# Patient Record
Sex: MALE | Race: BLACK OR AFRICAN AMERICAN | NOT HISPANIC OR LATINO | Employment: OTHER | URBAN - METROPOLITAN AREA
[De-identification: names, ages, dates, MRNs, and addresses within clinical notes are randomized per-mention and may not be internally consistent; named-entity substitution may affect disease eponyms.]

---

## 2018-01-05 ENCOUNTER — HOSPITAL ENCOUNTER (EMERGENCY)
Facility: HOSPITAL | Age: 43
Discharge: ANOTHER HOSPITAL | End: 2018-01-05
Attending: EMERGENCY MEDICINE
Payer: MEDICARE

## 2018-01-05 VITALS
RESPIRATION RATE: 20 BRPM | HEART RATE: 83 BPM | DIASTOLIC BLOOD PRESSURE: 86 MMHG | SYSTOLIC BLOOD PRESSURE: 120 MMHG | OXYGEN SATURATION: 98 % | TEMPERATURE: 98 F

## 2018-01-05 DIAGNOSIS — R44.3 HALLUCINATIONS: Primary | ICD-10-CM

## 2018-01-05 DIAGNOSIS — F20.3 UNDIFFERENTIATED SCHIZOPHRENIA: ICD-10-CM

## 2018-01-05 LAB
ALBUMIN SERPL BCP-MCNC: 3.5 G/DL
ALP SERPL-CCNC: 55 U/L
ALT SERPL W/O P-5'-P-CCNC: 16 U/L
ANION GAP SERPL CALC-SCNC: 12 MMOL/L
ANISOCYTOSIS BLD QL SMEAR: SLIGHT
APAP SERPL-MCNC: <3 UG/ML
AST SERPL-CCNC: 22 U/L
BASOPHILS # BLD AUTO: 0.06 K/UL
BASOPHILS NFR BLD: 1.9 %
BILIRUB SERPL-MCNC: 1 MG/DL
BUN SERPL-MCNC: 9 MG/DL
CALCIUM SERPL-MCNC: 9.1 MG/DL
CHLORIDE SERPL-SCNC: 93 MMOL/L
CO2 SERPL-SCNC: 31 MMOL/L
CREAT SERPL-MCNC: 0.8 MG/DL
DIFFERENTIAL METHOD: ABNORMAL
EOSINOPHIL # BLD AUTO: 0 K/UL
EOSINOPHIL NFR BLD: 0.6 %
ERYTHROCYTE [DISTWIDTH] IN BLOOD BY AUTOMATED COUNT: 14.2 %
EST. GFR  (AFRICAN AMERICAN): >60 ML/MIN/1.73 M^2
EST. GFR  (NON AFRICAN AMERICAN): >60 ML/MIN/1.73 M^2
ETHANOL SERPL-MCNC: <10 MG/DL
GLUCOSE SERPL-MCNC: 81 MG/DL
HCT VFR BLD AUTO: 45.5 %
HGB BLD-MCNC: 14.8 G/DL
LYMPHOCYTES # BLD AUTO: 1.6 K/UL
LYMPHOCYTES NFR BLD: 49.7 %
MCH RBC QN AUTO: 26.8 PG
MCHC RBC AUTO-ENTMCNC: 32.5 G/DL
MCV RBC AUTO: 82 FL
MONOCYTES # BLD AUTO: 0.8 K/UL
MONOCYTES NFR BLD: 23.8 %
NEUTROPHILS # BLD AUTO: 0.8 K/UL
NEUTROPHILS NFR BLD: 24 %
PLATELET # BLD AUTO: 192 K/UL
PLATELET BLD QL SMEAR: ABNORMAL
PMV BLD AUTO: 9.8 FL
POCT GLUCOSE: 90 MG/DL (ref 70–110)
POTASSIUM SERPL-SCNC: 3.3 MMOL/L
PROT SERPL-MCNC: 7.2 G/DL
RBC # BLD AUTO: 5.52 M/UL
SODIUM SERPL-SCNC: 136 MMOL/L
T4 FREE SERPL-MCNC: 1.18 NG/DL
TSH SERPL DL<=0.005 MIU/L-ACNC: 0.39 UIU/ML
WBC # BLD AUTO: 3.2 K/UL

## 2018-01-05 PROCEDURE — 80329 ANALGESICS NON-OPIOID 1 OR 2: CPT

## 2018-01-05 PROCEDURE — 80320 DRUG SCREEN QUANTALCOHOLS: CPT

## 2018-01-05 PROCEDURE — 84443 ASSAY THYROID STIM HORMONE: CPT

## 2018-01-05 PROCEDURE — 25000003 PHARM REV CODE 250: Performed by: PSYCHIATRY & NEUROLOGY

## 2018-01-05 PROCEDURE — 85025 COMPLETE CBC W/AUTO DIFF WBC: CPT

## 2018-01-05 PROCEDURE — 99285 EMERGENCY DEPT VISIT HI MDM: CPT | Mod: 25

## 2018-01-05 PROCEDURE — 82962 GLUCOSE BLOOD TEST: CPT

## 2018-01-05 PROCEDURE — 80053 COMPREHEN METABOLIC PANEL: CPT

## 2018-01-05 PROCEDURE — 84439 ASSAY OF FREE THYROXINE: CPT

## 2018-01-05 RX ORDER — PALIPERIDONE 3 MG/1
6 TABLET, EXTENDED RELEASE ORAL DAILY
Status: DISCONTINUED | OUTPATIENT
Start: 2018-01-05 | End: 2018-01-05 | Stop reason: HOSPADM

## 2018-01-05 RX ADMIN — PALIPERIDONE 6 MG: 3 TABLET, EXTENDED RELEASE ORAL at 06:01

## 2018-01-05 NOTE — ED NOTES
"2 pt identifiers for Gómez Ahumada verified and correct.    Pt presents to ED with multiple complaints but also with bizarre behavior. Pt states he wants his sugar checked, bp checked, heart checked. Pt having auditory hallucinations. Mumbling and speaking to "someone". Pt states "they said you hit on rivera punks." when asked who "they" were, he stated "Nevermind I am speaking too much.' pt continues to hum/sing and speak to himself. Pt not aggressive or combative. Pt denies SI/HI.   "

## 2018-01-05 NOTE — ED PROVIDER NOTES
"Encounter Date: 1/5/2018       History     Chief Complaint   Patient presents with    Bizarre Behavior     Pt with no sepcific complaints, requesting BP check, blood sugar check, vision test, hearing test, loos association in traige, denies hallucinations, denies SI/HI     42-year-old male with past medical history of hypertension and schizophrenia presents to the ED today for multiple complaints.  He is requesting to have his blood sugar, eyes, ears, digestive system, and his "numbers" checked.   The pt reports to me that he has been taking his BP meds as prescribed.  Per EMR, he has pmhx of schizophrenia, but denies use of any medications.  Pt has flight of ideas upon exam, and appears to be listening to and communicating with someone who is not in the room.  No other complaints at this time.       The history is provided by the patient and medical records.     Review of patient's allergies indicates:   Allergen Reactions    No known allergies      Past Medical History:   Diagnosis Date    Hypertension     Schizophrenia      Past Surgical History:   Procedure Laterality Date    ANKLE SURGERY       Family History   Problem Relation Age of Onset    Prostate cancer Neg Hx      Social History   Substance Use Topics    Smoking status: Never Smoker    Smokeless tobacco: Never Used    Alcohol use No     Review of Systems   Unable to perform ROS: Psychiatric disorder       Physical Exam     Initial Vitals [01/05/18 1254]   BP Pulse Resp Temp SpO2   (!) 193/135 98 16 98.1 °F (36.7 °C) 98 %      MAP       154.33         Physical Exam    Nursing note and vitals reviewed.  Constitutional: Vital signs are normal. He appears well-developed and well-nourished. He is active and cooperative.  Non-toxic appearance. He does not appear ill.   HENT:   Head: Normocephalic and atraumatic.   Eyes: Conjunctivae are normal.   Neck: Normal range of motion.   Cardiovascular: Normal rate and regular rhythm.   Pulmonary/Chest: Effort " normal and breath sounds normal.   Abdominal: Soft. Normal appearance and bowel sounds are normal. There is no tenderness.   Neurological: He is alert and oriented to person, place, and time. He has normal strength. No cranial nerve deficit or sensory deficit. GCS eye subscore is 4. GCS verbal subscore is 5. GCS motor subscore is 6.   Skin: Skin is warm, dry and intact. No rash noted.   Psychiatric: He has a normal mood and affect. His speech is normal and behavior is normal. Judgment and thought content normal. He is actively hallucinating. Cognition and memory are normal.         ED Course   Procedures  Labs Reviewed   CBC W/ AUTO DIFFERENTIAL - Abnormal; Notable for the following:        Result Value    WBC 3.20 (*)     MCH 26.8 (*)     Gran # 0.8 (*)     Gran% 24.0 (*)     Lymph% 49.7 (*)     Mono% 23.8 (*)     All other components within normal limits   COMPREHENSIVE METABOLIC PANEL - Abnormal; Notable for the following:     Potassium 3.3 (*)     Chloride 93 (*)     CO2 31 (*)     All other components within normal limits   TSH - Abnormal; Notable for the following:     TSH 0.394 (*)     All other components within normal limits   ACETAMINOPHEN LEVEL - Abnormal; Notable for the following:     Acetaminophen (Tylenol), Serum <3.0 (*)     All other components within normal limits   ALCOHOL,MEDICAL (ETHANOL)   T4, FREE   URINALYSIS   DRUG SCREEN PANEL, URINE EMERGENCY   POCT GLUCOSE   POCT GLUCOSE MONITORING CONTINUOUS             Medical Decision Making:   Initial Assessment:   42-year-old male with past medical history of hypertension and schizophrenia is here for multiple complaints.  The patient is exhibiting bizarre behavior with flight of ideas and is responding to auditory hallucinations.  Differential Diagnosis:   Psychiatric disorder, medication noncompliance, electrolyte derangement  Clinical Tests:   Lab Tests: Ordered and Reviewed  ED Management:  Labs,   1700:  at the bedside. Pt is medically  stable for psych evaluation.   Pt will be transferred for psych placement.                    ED Course      Clinical Impression:   The encounter diagnosis was Hallucinations.                           Trina Hensley, KATARINA  01/05/18 1944

## 2018-01-05 NOTE — ED NOTES
APPEARANCE: Alert, oriented and in no acute distress. +pt cooperative and calm at this time.   CARDIAC: Normal rate and rhythm. Pt denies chest pain.   RESPIRATORY:Normal rate and effort. Respirations are equal and unlabored no obvious signs of distress.  MUSC: Full ROM. No bony tenderness or soft tissue tenderness. No obvious deformity.  SKIN: Skin is warm and dry, normal skin turgor, mucous membranes moist.  NEURO: [place] coma scale: eyes open spontaneously-4, oriented & converses-5, obeys commands-6. No neurological abnormalities.   MENTAL STATUS: awake, alert and aware of environment. +pt presents with bizarre behavior, auditory hallucinations  EYE: PERRL, both eyes: pupils brisk and reactive to light. Normal size.

## 2018-01-08 NOTE — PSYCH
"IDENTIFICATION DATA:  This is a 42-year-old single  male who was   brought to ER by staff due to multiple somatic complaints.  The patient was   psychotic and noncompliant with medications.  The patient was seen at the   request of Dr. Hudson for psych evaluation.  The patient is on a PEC status.    CHIEF COMPLAINT:  "I came by myself for checkup."    HISTORY OF PRESENTING ILLNESS:  According to staff, the patient is acting   bizarre.  He was talking to his niece in his head.  He is noncompliant with   schizophrenic medicine.  The patient had multiple somatic complaints especially   related to blood pressure, sugar, eyes, GI and other body organs.  The patient   was fixated on schizophrenia.  The patient states that he cannot pronounce this   word, but a lady about eight months ago was diagnosed with this.  The patient   asked multiple times how to pronounce that word and saying the same phrase again   that he cannot pronounce that.  The patient was spelled out on the paper and he   said that he cannot read.  The patient denies use of alcohol and drugs.  His   urine drug screen in the past was negative.  The patient states that he has no   job, but it is not bothering much, but it could.  The patient states that he is   here for his help.  He denies any family problems.  The patient states that he   lives with his family.  He states that he trusts in God and life remains okay.    He was talking about Lord.  He was preoccupied and distracted.  He is very   intense.  He is disorganized at this time.  He was on Abilify and Invega before.    The patient denies any depression or anxiety.  He states that his sleep is   good.    PAST PSYCHIATRIC HISTORY:  The patient states that lady diagnosed him with   schizophrenia eight months ago.  The patient cannot remember the names of those   medicines, but recognized Invega and Abilify prescribed before.  The patient did   not answer questions about [place] and " Rehabilitation.  He is not homeless.    The patient is not on any schizophrenic medicines.  He is on Lipitor and   Cardizem.    SOCIAL HISTORY:  The patient states that he is not from here.  He is trying to   move.  The patient states that he lives with his family.  He is single and has   no children.  He states that he did not go to school and cannot read much.  He   denies family history of mental illness, suicidal or drug problems.    MEDICAL HISTORY:  Unremarkable.    CBC:  His WBC is 3.2, hemoglobin 14.3, hematocrit 45, MCV 84, platelets 193.    Sodium 136, potassium 3.3.  TSH 0.3, blood pressure is 120/86.    ALLERGIES:  He is not allergic to any medicine or food.    He is on Lipitor and Cardizem.  See H and P for details.    MENTAL STATUS EXAMINATION:  This is a 42-year-old healthy-looking tall    American male who is attired in blue scrubs, exhibited fair eye contact.  He has   intense look.  His speech is clear, but disorganized.  He is obsessed with word   schizophrenia versus schizophrenic and was trying to correct schizophrenia many   times and then said that he cannot pronounce it.  He was seen responding to   internal stimuli.  He was talking to his niece in his head.  He is distracted.    His mood is frustrated with constricted affect.  No tremors, injuries noted.  He   walked in a very strange way.  He denies hearing voices or seeing things.  The   patient has no thoughts of harm to self or others.  His insight and judgement   and fair.  He is of average intelligence person.    PSYCHIATRIC DIAGNOSES:  AXIS I:  Schizophrenia, paranoid type.  AXIS II:  No diagnoses.  AXIS III:  Hypercholesterolemia, hypertension.  AXIS IV:  Medical problems, noncompliant with medicines, chronic mental illness.  AXIS V:  35.    RECOMMENDATIONS:  We will transfer this patient to [facility] for   stabilization.  His initial diagnostic workup, we are trying to get information   from family.  We will restart  Abilify 6 mg p.o. q.a.m., and we will consider the   option of Invega Sustenna.    PROGNOSIS:  Fair.    ESTIMATED LENGTH OF STAY: 5 days    ASSETS:  The patient is verbal, medically stable, and has a place to live.          /venancio [id number] blank(s)        AI/KIA  dd: 01/05/2018 17:36:49 (CST)  td: 01/06/2018 04:06:08 (CST)  Doc ID   #[id number]  Job ID #[id number]    CC:

## 2021-04-30 ENCOUNTER — HOSPITAL ENCOUNTER (EMERGENCY)
Facility: HOSPITAL | Age: 46
Discharge: PSYCHIATRIC OR SUBSTANCE USE FACILITY | End: 2021-05-01
Attending: EMERGENCY MEDICINE
Payer: COMMERCIAL

## 2021-04-30 DIAGNOSIS — F23 ACUTE PSYCHOSIS: Primary | ICD-10-CM

## 2021-04-30 DIAGNOSIS — Z00.8 MEDICAL CLEARANCE FOR PSYCHIATRIC ADMISSION: ICD-10-CM

## 2021-04-30 DIAGNOSIS — R94.31 QT PROLONGATION: ICD-10-CM

## 2021-04-30 LAB
ALBUMIN SERPL BCP-MCNC: 4.1 G/DL (ref 3.5–5.2)
ALP SERPL-CCNC: 67 U/L (ref 55–135)
ALT SERPL W/O P-5'-P-CCNC: 20 U/L (ref 10–44)
AMPHET+METHAMPHET UR QL: NEGATIVE
ANION GAP SERPL CALC-SCNC: 15 MMOL/L (ref 8–16)
APAP SERPL-MCNC: <3 UG/ML (ref 10–20)
AST SERPL-CCNC: 23 U/L (ref 10–40)
BARBITURATES UR QL SCN>200 NG/ML: NEGATIVE
BASOPHILS # BLD AUTO: 0.09 K/UL (ref 0–0.2)
BASOPHILS NFR BLD: 0.8 % (ref 0–1.9)
BENZODIAZ UR QL SCN>200 NG/ML: NEGATIVE
BILIRUB SERPL-MCNC: 1.1 MG/DL (ref 0.1–1)
BILIRUB UR QL STRIP: NEGATIVE
BUN SERPL-MCNC: 20 MG/DL (ref 6–20)
BZE UR QL SCN: NEGATIVE
CALCIUM SERPL-MCNC: 10.2 MG/DL (ref 8.7–10.5)
CANNABINOIDS UR QL SCN: NEGATIVE
CHLORIDE SERPL-SCNC: 99 MMOL/L (ref 95–110)
CLARITY UR REFRACT.AUTO: CLEAR
CO2 SERPL-SCNC: 25 MMOL/L (ref 23–29)
COLOR UR AUTO: YELLOW
CREAT SERPL-MCNC: 1.4 MG/DL (ref 0.5–1.4)
CREAT UR-MCNC: 184 MG/DL (ref 23–375)
CTP QC/QA: YES
DIFFERENTIAL METHOD: ABNORMAL
EOSINOPHIL # BLD AUTO: 0.2 K/UL (ref 0–0.5)
EOSINOPHIL NFR BLD: 2.2 % (ref 0–8)
ERYTHROCYTE [DISTWIDTH] IN BLOOD BY AUTOMATED COUNT: 17.2 % (ref 11.5–14.5)
EST. GFR  (AFRICAN AMERICAN): >60 ML/MIN/1.73 M^2
EST. GFR  (NON AFRICAN AMERICAN): >60 ML/MIN/1.73 M^2
ETHANOL SERPL-MCNC: <10 MG/DL
GLUCOSE SERPL-MCNC: 94 MG/DL (ref 70–110)
GLUCOSE UR QL STRIP: NEGATIVE
HCT VFR BLD AUTO: 46.5 % (ref 40–54)
HGB BLD-MCNC: 15.7 G/DL (ref 14–18)
HGB UR QL STRIP: NEGATIVE
IMM GRANULOCYTES # BLD AUTO: 0.05 K/UL (ref 0–0.04)
IMM GRANULOCYTES NFR BLD AUTO: 0.4 % (ref 0–0.5)
KETONES UR QL STRIP: ABNORMAL
LEUKOCYTE ESTERASE UR QL STRIP: NEGATIVE
LYMPHOCYTES # BLD AUTO: 3.1 K/UL (ref 1–4.8)
LYMPHOCYTES NFR BLD: 27.5 % (ref 18–48)
MCH RBC QN AUTO: 27.1 PG (ref 27–31)
MCHC RBC AUTO-ENTMCNC: 33.8 G/DL (ref 32–36)
MCV RBC AUTO: 80 FL (ref 82–98)
METHADONE UR QL SCN>300 NG/ML: NEGATIVE
MONOCYTES # BLD AUTO: 1.4 K/UL (ref 0.3–1)
MONOCYTES NFR BLD: 12.3 % (ref 4–15)
NEUTROPHILS # BLD AUTO: 6.3 K/UL (ref 1.8–7.7)
NEUTROPHILS NFR BLD: 56.8 % (ref 38–73)
NITRITE UR QL STRIP: NEGATIVE
NRBC BLD-RTO: 0 /100 WBC
OPIATES UR QL SCN: NEGATIVE
PCP UR QL SCN>25 NG/ML: NEGATIVE
PH UR STRIP: 5 [PH] (ref 5–8)
PLATELET # BLD AUTO: 320 K/UL (ref 150–450)
PMV BLD AUTO: 10.2 FL (ref 9.2–12.9)
POTASSIUM SERPL-SCNC: 3.2 MMOL/L (ref 3.5–5.1)
PROT SERPL-MCNC: 7.9 G/DL (ref 6–8.4)
PROT UR QL STRIP: NEGATIVE
RBC # BLD AUTO: 5.79 M/UL (ref 4.6–6.2)
SARS-COV-2 RDRP RESP QL NAA+PROBE: NEGATIVE
SODIUM SERPL-SCNC: 139 MMOL/L (ref 136–145)
SP GR UR STRIP: 1.01 (ref 1–1.03)
TOXICOLOGY INFORMATION: NORMAL
TSH SERPL DL<=0.005 MIU/L-ACNC: 3.37 UIU/ML (ref 0.4–4)
URN SPEC COLLECT METH UR: ABNORMAL
WBC # BLD AUTO: 11.14 K/UL (ref 3.9–12.7)

## 2021-04-30 PROCEDURE — 80053 COMPREHEN METABOLIC PANEL: CPT | Performed by: PHYSICIAN ASSISTANT

## 2021-04-30 PROCEDURE — 85025 COMPLETE CBC W/AUTO DIFF WBC: CPT | Performed by: PHYSICIAN ASSISTANT

## 2021-04-30 PROCEDURE — 99285 PR EMERGENCY DEPT VISIT,LEVEL V: ICD-10-PCS | Mod: ,,, | Performed by: PHYSICIAN ASSISTANT

## 2021-04-30 PROCEDURE — 81003 URINALYSIS AUTO W/O SCOPE: CPT | Mod: 59 | Performed by: PHYSICIAN ASSISTANT

## 2021-04-30 PROCEDURE — 84443 ASSAY THYROID STIM HORMONE: CPT | Performed by: PHYSICIAN ASSISTANT

## 2021-04-30 PROCEDURE — 93005 ELECTROCARDIOGRAM TRACING: CPT

## 2021-04-30 PROCEDURE — 99283 EMERGENCY DEPT VISIT LOW MDM: CPT | Mod: 25

## 2021-04-30 PROCEDURE — 82077 ASSAY SPEC XCP UR&BREATH IA: CPT | Performed by: PHYSICIAN ASSISTANT

## 2021-04-30 PROCEDURE — U0002 COVID-19 LAB TEST NON-CDC: HCPCS | Performed by: PHYSICIAN ASSISTANT

## 2021-04-30 PROCEDURE — 99285 EMERGENCY DEPT VISIT HI MDM: CPT | Mod: ,,, | Performed by: PHYSICIAN ASSISTANT

## 2021-04-30 PROCEDURE — 93010 EKG 12-LEAD: ICD-10-PCS | Mod: ,,, | Performed by: INTERNAL MEDICINE

## 2021-04-30 PROCEDURE — 93010 ELECTROCARDIOGRAM REPORT: CPT | Mod: ,,, | Performed by: INTERNAL MEDICINE

## 2021-04-30 PROCEDURE — 80143 DRUG ASSAY ACETAMINOPHEN: CPT | Performed by: PHYSICIAN ASSISTANT

## 2021-04-30 PROCEDURE — 80307 DRUG TEST PRSMV CHEM ANLYZR: CPT | Performed by: PHYSICIAN ASSISTANT

## 2021-04-30 RX ORDER — BREXPIPRAZOLE 4 MG/1
1 TABLET ORAL DAILY
Status: ON HOLD | COMMUNITY
Start: 2021-04-23 | End: 2021-05-06 | Stop reason: HOSPADM

## 2021-04-30 RX ORDER — AMLODIPINE AND BENAZEPRIL HYDROCHLORIDE 10; 20 MG/1; MG/1
1 CAPSULE ORAL DAILY
Status: ON HOLD | COMMUNITY
Start: 2021-02-09 | End: 2021-05-06 | Stop reason: HOSPADM

## 2021-04-30 RX ORDER — METFORMIN HYDROCHLORIDE 500 MG/1
500 TABLET ORAL 2 TIMES DAILY
Status: ON HOLD | COMMUNITY
Start: 2021-02-09 | End: 2021-05-06 | Stop reason: SDUPTHER

## 2021-04-30 RX ORDER — ARIPIPRAZOLE 20 MG/1
40 TABLET ORAL NIGHTLY
Status: ON HOLD | COMMUNITY
Start: 2021-02-01 | End: 2021-05-06 | Stop reason: HOSPADM

## 2021-04-30 RX ORDER — HYDROCHLOROTHIAZIDE 50 MG/1
50 TABLET ORAL DAILY
Status: ON HOLD | COMMUNITY
Start: 2021-02-08 | End: 2021-05-06 | Stop reason: SDUPTHER

## 2021-04-30 RX ORDER — ARIPIPRAZOLE 20 MG/1
40 TABLET ORAL
Status: DISCONTINUED | OUTPATIENT
Start: 2021-04-30 | End: 2021-04-30

## 2021-05-01 VITALS
WEIGHT: 240 LBS | RESPIRATION RATE: 18 BRPM | HEART RATE: 108 BPM | BODY MASS INDEX: 32.51 KG/M2 | OXYGEN SATURATION: 94 % | DIASTOLIC BLOOD PRESSURE: 83 MMHG | TEMPERATURE: 99 F | SYSTOLIC BLOOD PRESSURE: 133 MMHG | HEIGHT: 72 IN

## 2021-05-01 PROBLEM — F29 PSYCHOSIS: Status: ACTIVE | Noted: 2021-05-01

## 2021-05-01 PROBLEM — E78.5 HLD (HYPERLIPIDEMIA): Status: ACTIVE | Noted: 2021-05-01

## 2021-05-01 PROBLEM — E11.9 TYPE 2 DIABETES MELLITUS: Status: ACTIVE | Noted: 2021-05-01

## 2021-05-01 PROBLEM — E87.6 HYPOKALEMIA: Status: ACTIVE | Noted: 2021-05-01

## 2021-05-06 PROBLEM — F20.3 UNDIFFERENTIATED SCHIZOPHRENIA: Status: ACTIVE | Noted: 2021-05-01

## 2021-06-20 ENCOUNTER — HOSPITAL ENCOUNTER (EMERGENCY)
Facility: HOSPITAL | Age: 46
Discharge: PSYCHIATRIC OR SUBSTANCE USE FACILITY | End: 2021-06-20
Attending: EMERGENCY MEDICINE
Payer: COMMERCIAL

## 2021-06-20 VITALS
RESPIRATION RATE: 20 BRPM | HEIGHT: 72 IN | TEMPERATURE: 99 F | OXYGEN SATURATION: 97 % | WEIGHT: 240 LBS | DIASTOLIC BLOOD PRESSURE: 65 MMHG | BODY MASS INDEX: 32.51 KG/M2 | HEART RATE: 81 BPM | SYSTOLIC BLOOD PRESSURE: 116 MMHG

## 2021-06-20 DIAGNOSIS — F20.9 SCHIZOPHRENIA, UNSPECIFIED TYPE: Primary | ICD-10-CM

## 2021-06-20 LAB
ALBUMIN SERPL BCP-MCNC: 3.6 G/DL (ref 3.5–5.2)
ALP SERPL-CCNC: 69 U/L (ref 55–135)
ALT SERPL W/O P-5'-P-CCNC: 17 U/L (ref 10–44)
AMPHET+METHAMPHET UR QL: NEGATIVE
ANION GAP SERPL CALC-SCNC: 9 MMOL/L (ref 8–16)
APAP SERPL-MCNC: <3 UG/ML (ref 10–20)
AST SERPL-CCNC: 17 U/L (ref 10–40)
BARBITURATES UR QL SCN>200 NG/ML: NEGATIVE
BASOPHILS # BLD AUTO: 0.04 K/UL (ref 0–0.2)
BASOPHILS NFR BLD: 0.6 % (ref 0–1.9)
BENZODIAZ UR QL SCN>200 NG/ML: NEGATIVE
BILIRUB SERPL-MCNC: 1.9 MG/DL (ref 0.1–1)
BILIRUB UR QL STRIP: NEGATIVE
BUN SERPL-MCNC: 11 MG/DL (ref 6–20)
BZE UR QL SCN: NEGATIVE
CALCIUM SERPL-MCNC: 8.7 MG/DL (ref 8.7–10.5)
CANNABINOIDS UR QL SCN: NEGATIVE
CHLORIDE SERPL-SCNC: 102 MMOL/L (ref 95–110)
CLARITY UR: CLEAR
CO2 SERPL-SCNC: 28 MMOL/L (ref 23–29)
COLOR UR: YELLOW
CREAT SERPL-MCNC: 0.8 MG/DL (ref 0.5–1.4)
CREAT UR-MCNC: 105.7 MG/DL (ref 23–375)
DIFFERENTIAL METHOD: ABNORMAL
EOSINOPHIL # BLD AUTO: 0.1 K/UL (ref 0–0.5)
EOSINOPHIL NFR BLD: 2.1 % (ref 0–8)
ERYTHROCYTE [DISTWIDTH] IN BLOOD BY AUTOMATED COUNT: 14.9 % (ref 11.5–14.5)
EST. GFR  (AFRICAN AMERICAN): >60 ML/MIN/1.73 M^2
EST. GFR  (NON AFRICAN AMERICAN): >60 ML/MIN/1.73 M^2
ETHANOL SERPL-MCNC: <10 MG/DL
GLUCOSE SERPL-MCNC: 80 MG/DL (ref 70–110)
GLUCOSE UR QL STRIP: NEGATIVE
HCT VFR BLD AUTO: 42.6 % (ref 40–54)
HGB BLD-MCNC: 14.2 G/DL (ref 14–18)
HGB UR QL STRIP: NEGATIVE
IMM GRANULOCYTES # BLD AUTO: 0.02 K/UL (ref 0–0.04)
IMM GRANULOCYTES NFR BLD AUTO: 0.3 % (ref 0–0.5)
KETONES UR QL STRIP: ABNORMAL
LEUKOCYTE ESTERASE UR QL STRIP: NEGATIVE
LYMPHOCYTES # BLD AUTO: 1.7 K/UL (ref 1–4.8)
LYMPHOCYTES NFR BLD: 25.4 % (ref 18–48)
MCH RBC QN AUTO: 28.1 PG (ref 27–31)
MCHC RBC AUTO-ENTMCNC: 33.3 G/DL (ref 32–36)
MCV RBC AUTO: 84 FL (ref 82–98)
METHADONE UR QL SCN>300 NG/ML: NEGATIVE
MONOCYTES # BLD AUTO: 1 K/UL (ref 0.3–1)
MONOCYTES NFR BLD: 14.6 % (ref 4–15)
NEUTROPHILS # BLD AUTO: 3.8 K/UL (ref 1.8–7.7)
NEUTROPHILS NFR BLD: 57 % (ref 38–73)
NITRITE UR QL STRIP: NEGATIVE
NRBC BLD-RTO: 0 /100 WBC
OPIATES UR QL SCN: NEGATIVE
PCP UR QL SCN>25 NG/ML: NEGATIVE
PH UR STRIP: 8 [PH] (ref 5–8)
PLATELET # BLD AUTO: 209 K/UL (ref 150–450)
PMV BLD AUTO: 10.1 FL (ref 9.2–12.9)
POTASSIUM SERPL-SCNC: 3.6 MMOL/L (ref 3.5–5.1)
PROT SERPL-MCNC: 6.4 G/DL (ref 6–8.4)
PROT UR QL STRIP: NEGATIVE
RBC # BLD AUTO: 5.05 M/UL (ref 4.6–6.2)
SARS-COV-2 RDRP RESP QL NAA+PROBE: NEGATIVE
SODIUM SERPL-SCNC: 139 MMOL/L (ref 136–145)
SP GR UR STRIP: 1.01 (ref 1–1.03)
TOXICOLOGY INFORMATION: NORMAL
TSH SERPL DL<=0.005 MIU/L-ACNC: 2.54 UIU/ML (ref 0.4–4)
URN SPEC COLLECT METH UR: ABNORMAL
UROBILINOGEN UR STRIP-ACNC: NEGATIVE EU/DL
WBC # BLD AUTO: 6.58 K/UL (ref 3.9–12.7)

## 2021-06-20 PROCEDURE — 80143 DRUG ASSAY ACETAMINOPHEN: CPT | Performed by: EMERGENCY MEDICINE

## 2021-06-20 PROCEDURE — U0002 COVID-19 LAB TEST NON-CDC: HCPCS | Performed by: EMERGENCY MEDICINE

## 2021-06-20 PROCEDURE — 99285 EMERGENCY DEPT VISIT HI MDM: CPT | Mod: 25

## 2021-06-20 PROCEDURE — 84443 ASSAY THYROID STIM HORMONE: CPT | Performed by: EMERGENCY MEDICINE

## 2021-06-20 PROCEDURE — 80053 COMPREHEN METABOLIC PANEL: CPT | Performed by: EMERGENCY MEDICINE

## 2021-06-20 PROCEDURE — 81003 URINALYSIS AUTO W/O SCOPE: CPT | Performed by: EMERGENCY MEDICINE

## 2021-06-20 PROCEDURE — 82077 ASSAY SPEC XCP UR&BREATH IA: CPT | Performed by: EMERGENCY MEDICINE

## 2021-06-20 PROCEDURE — 80307 DRUG TEST PRSMV CHEM ANLYZR: CPT | Performed by: EMERGENCY MEDICINE

## 2021-06-20 PROCEDURE — 85025 COMPLETE CBC W/AUTO DIFF WBC: CPT | Performed by: EMERGENCY MEDICINE

## 2023-02-18 ENCOUNTER — HOSPITAL ENCOUNTER (EMERGENCY)
Facility: HOSPITAL | Age: 48
Discharge: PSYCHIATRIC OR SUBSTANCE USE FACILITY | End: 2023-02-19
Attending: EMERGENCY MEDICINE
Payer: COMMERCIAL

## 2023-02-18 DIAGNOSIS — F20.9 SCHIZOPHRENIA, UNSPECIFIED TYPE: ICD-10-CM

## 2023-02-18 DIAGNOSIS — Z00.8 MEDICAL CLEARANCE FOR PSYCHIATRIC ADMISSION: ICD-10-CM

## 2023-02-18 DIAGNOSIS — R45.851 SUICIDAL IDEATION: Primary | ICD-10-CM

## 2023-02-18 DIAGNOSIS — F23 ACUTE PSYCHOSIS: ICD-10-CM

## 2023-02-18 LAB
ALBUMIN SERPL BCP-MCNC: 4.1 G/DL (ref 3.5–5.2)
ALP SERPL-CCNC: 71 U/L (ref 55–135)
ALT SERPL W/O P-5'-P-CCNC: 16 U/L (ref 10–44)
AMPHET+METHAMPHET UR QL: NEGATIVE
ANION GAP SERPL CALC-SCNC: 11 MMOL/L (ref 8–16)
ANION GAP SERPL CALC-SCNC: 12 MMOL/L (ref 8–16)
ANION GAP SERPL CALC-SCNC: 13 MMOL/L (ref 8–16)
APAP SERPL-MCNC: <3 UG/ML (ref 10–20)
AST SERPL-CCNC: 17 U/L (ref 10–40)
BARBITURATES UR QL SCN>200 NG/ML: NEGATIVE
BASOPHILS # BLD AUTO: 0.04 K/UL (ref 0–0.2)
BASOPHILS NFR BLD: 0.9 % (ref 0–1.9)
BENZODIAZ UR QL SCN>200 NG/ML: NEGATIVE
BILIRUB SERPL-MCNC: 1.5 MG/DL (ref 0.1–1)
BILIRUB UR QL STRIP: NEGATIVE
BUN SERPL-MCNC: 10 MG/DL (ref 6–30)
BUN SERPL-MCNC: 8 MG/DL (ref 6–20)
BUN SERPL-MCNC: 8 MG/DL (ref 6–20)
BUN SERPL-MCNC: 9 MG/DL (ref 6–20)
BUN SERPL-MCNC: 9 MG/DL (ref 6–30)
BZE UR QL SCN: NEGATIVE
CALCIUM SERPL-MCNC: 9.3 MG/DL (ref 8.7–10.5)
CALCIUM SERPL-MCNC: 9.6 MG/DL (ref 8.7–10.5)
CALCIUM SERPL-MCNC: 9.8 MG/DL (ref 8.7–10.5)
CANNABINOIDS UR QL SCN: NEGATIVE
CHLORIDE SERPL-SCNC: 93 MMOL/L (ref 95–110)
CHLORIDE SERPL-SCNC: 94 MMOL/L (ref 95–110)
CHLORIDE SERPL-SCNC: 96 MMOL/L (ref 95–110)
CLARITY UR REFRACT.AUTO: CLEAR
CO2 SERPL-SCNC: 29 MMOL/L (ref 23–29)
CO2 SERPL-SCNC: 30 MMOL/L (ref 23–29)
CO2 SERPL-SCNC: 32 MMOL/L (ref 23–29)
COLOR UR AUTO: YELLOW
CREAT SERPL-MCNC: 0.8 MG/DL (ref 0.5–1.4)
CREAT SERPL-MCNC: 0.9 MG/DL (ref 0.5–1.4)
CREAT SERPL-MCNC: 1 MG/DL (ref 0.5–1.4)
CREAT UR-MCNC: 246 MG/DL (ref 23–375)
DIFFERENTIAL METHOD: NORMAL
EOSINOPHIL # BLD AUTO: 0.1 K/UL (ref 0–0.5)
EOSINOPHIL NFR BLD: 1.6 % (ref 0–8)
ERYTHROCYTE [DISTWIDTH] IN BLOOD BY AUTOMATED COUNT: 13.7 % (ref 11.5–14.5)
EST. GFR  (NO RACE VARIABLE): >60 ML/MIN/1.73 M^2
ETHANOL SERPL-MCNC: <10 MG/DL
GLUCOSE SERPL-MCNC: 100 MG/DL (ref 70–110)
GLUCOSE SERPL-MCNC: 82 MG/DL (ref 70–110)
GLUCOSE SERPL-MCNC: 84 MG/DL (ref 70–110)
GLUCOSE SERPL-MCNC: 90 MG/DL (ref 70–110)
GLUCOSE SERPL-MCNC: 97 MG/DL (ref 70–110)
GLUCOSE UR QL STRIP: NEGATIVE
HCT VFR BLD AUTO: 50.1 % (ref 40–54)
HCT VFR BLD CALC: 47 %PCV (ref 36–54)
HCT VFR BLD CALC: 48 %PCV (ref 36–54)
HCV AB SERPL QL IA: NORMAL
HGB BLD-MCNC: 16.5 G/DL (ref 14–18)
HGB UR QL STRIP: NEGATIVE
HIV 1+2 AB+HIV1 P24 AG SERPL QL IA: NORMAL
IMM GRANULOCYTES # BLD AUTO: 0.01 K/UL (ref 0–0.04)
IMM GRANULOCYTES NFR BLD AUTO: 0.2 % (ref 0–0.5)
KETONES UR QL STRIP: NEGATIVE
LEUKOCYTE ESTERASE UR QL STRIP: NEGATIVE
LYMPHOCYTES # BLD AUTO: 1.3 K/UL (ref 1–4.8)
LYMPHOCYTES NFR BLD: 29.3 % (ref 18–48)
MCH RBC QN AUTO: 27.2 PG (ref 27–31)
MCHC RBC AUTO-ENTMCNC: 32.9 G/DL (ref 32–36)
MCV RBC AUTO: 83 FL (ref 82–98)
METHADONE UR QL SCN>300 NG/ML: NEGATIVE
MONOCYTES # BLD AUTO: 0.7 K/UL (ref 0.3–1)
MONOCYTES NFR BLD: 14.7 % (ref 4–15)
NEUTROPHILS # BLD AUTO: 2.4 K/UL (ref 1.8–7.7)
NEUTROPHILS NFR BLD: 53.3 % (ref 38–73)
NITRITE UR QL STRIP: NEGATIVE
NRBC BLD-RTO: 0 /100 WBC
OPIATES UR QL SCN: NEGATIVE
PCP UR QL SCN>25 NG/ML: NEGATIVE
PH UR STRIP: 7 [PH] (ref 5–8)
PLATELET # BLD AUTO: 266 K/UL (ref 150–450)
PMV BLD AUTO: 9.8 FL (ref 9.2–12.9)
POC IONIZED CALCIUM: 1.13 MMOL/L (ref 1.06–1.42)
POC IONIZED CALCIUM: 1.2 MMOL/L (ref 1.06–1.42)
POC TCO2 (MEASURED): 33 MMOL/L (ref 23–29)
POC TCO2 (MEASURED): 33 MMOL/L (ref 23–29)
POTASSIUM BLD-SCNC: 2.8 MMOL/L (ref 3.5–5.1)
POTASSIUM BLD-SCNC: 3.1 MMOL/L (ref 3.5–5.1)
POTASSIUM SERPL-SCNC: 2.7 MMOL/L (ref 3.5–5.1)
POTASSIUM SERPL-SCNC: 2.9 MMOL/L (ref 3.5–5.1)
POTASSIUM SERPL-SCNC: 3.1 MMOL/L (ref 3.5–5.1)
PROT SERPL-MCNC: 7.6 G/DL (ref 6–8.4)
PROT UR QL STRIP: ABNORMAL
RBC # BLD AUTO: 6.06 M/UL (ref 4.6–6.2)
SAMPLE: ABNORMAL
SAMPLE: ABNORMAL
SODIUM BLD-SCNC: 137 MMOL/L (ref 136–145)
SODIUM BLD-SCNC: 138 MMOL/L (ref 136–145)
SODIUM SERPL-SCNC: 138 MMOL/L (ref 136–145)
SODIUM SERPL-SCNC: 138 MMOL/L (ref 136–145)
SODIUM SERPL-SCNC: 139 MMOL/L (ref 136–145)
SP GR UR STRIP: 1.02 (ref 1–1.03)
TOXICOLOGY INFORMATION: NORMAL
TSH SERPL DL<=0.005 MIU/L-ACNC: 1.96 UIU/ML (ref 0.4–4)
URN SPEC COLLECT METH UR: ABNORMAL
WBC # BLD AUTO: 4.5 K/UL (ref 3.9–12.7)

## 2023-02-18 PROCEDURE — 93010 EKG 12-LEAD: ICD-10-PCS | Mod: ,,, | Performed by: INTERNAL MEDICINE

## 2023-02-18 PROCEDURE — 80053 COMPREHEN METABOLIC PANEL: CPT | Performed by: EMERGENCY MEDICINE

## 2023-02-18 PROCEDURE — 80047 BASIC METABLC PNL IONIZED CA: CPT | Mod: 91

## 2023-02-18 PROCEDURE — 80307 DRUG TEST PRSMV CHEM ANLYZR: CPT | Performed by: EMERGENCY MEDICINE

## 2023-02-18 PROCEDURE — 86803 HEPATITIS C AB TEST: CPT | Performed by: EMERGENCY MEDICINE

## 2023-02-18 PROCEDURE — 96375 TX/PRO/DX INJ NEW DRUG ADDON: CPT

## 2023-02-18 PROCEDURE — 93010 ELECTROCARDIOGRAM REPORT: CPT | Mod: ,,, | Performed by: INTERNAL MEDICINE

## 2023-02-18 PROCEDURE — 84443 ASSAY THYROID STIM HORMONE: CPT | Performed by: EMERGENCY MEDICINE

## 2023-02-18 PROCEDURE — 99285 EMERGENCY DEPT VISIT HI MDM: CPT | Mod: ,,, | Performed by: EMERGENCY MEDICINE

## 2023-02-18 PROCEDURE — 96365 THER/PROPH/DIAG IV INF INIT: CPT

## 2023-02-18 PROCEDURE — 81003 URINALYSIS AUTO W/O SCOPE: CPT | Mod: 59 | Performed by: EMERGENCY MEDICINE

## 2023-02-18 PROCEDURE — 99285 PR EMERGENCY DEPT VISIT,LEVEL V: ICD-10-PCS | Mod: ,,, | Performed by: EMERGENCY MEDICINE

## 2023-02-18 PROCEDURE — 87389 HIV-1 AG W/HIV-1&-2 AB AG IA: CPT | Performed by: EMERGENCY MEDICINE

## 2023-02-18 PROCEDURE — 93005 ELECTROCARDIOGRAM TRACING: CPT

## 2023-02-18 PROCEDURE — 96366 THER/PROPH/DIAG IV INF ADDON: CPT

## 2023-02-18 PROCEDURE — 63600175 PHARM REV CODE 636 W HCPCS: Performed by: EMERGENCY MEDICINE

## 2023-02-18 PROCEDURE — 80143 DRUG ASSAY ACETAMINOPHEN: CPT | Performed by: EMERGENCY MEDICINE

## 2023-02-18 PROCEDURE — 99285 EMERGENCY DEPT VISIT HI MDM: CPT | Mod: 25

## 2023-02-18 PROCEDURE — 25000003 PHARM REV CODE 250: Performed by: EMERGENCY MEDICINE

## 2023-02-18 PROCEDURE — 85025 COMPLETE CBC W/AUTO DIFF WBC: CPT | Performed by: EMERGENCY MEDICINE

## 2023-02-18 PROCEDURE — 82077 ASSAY SPEC XCP UR&BREATH IA: CPT | Performed by: EMERGENCY MEDICINE

## 2023-02-18 PROCEDURE — 80048 BASIC METABOLIC PNL TOTAL CA: CPT | Mod: XB | Performed by: EMERGENCY MEDICINE

## 2023-02-18 RX ORDER — LORAZEPAM 2 MG/ML
1 INJECTION INTRAMUSCULAR
Status: COMPLETED | OUTPATIENT
Start: 2023-02-18 | End: 2023-02-18

## 2023-02-18 RX ORDER — POTASSIUM CHLORIDE 20 MEQ/1
20 TABLET, EXTENDED RELEASE ORAL ONCE
Status: COMPLETED | OUTPATIENT
Start: 2023-02-18 | End: 2023-02-18

## 2023-02-18 RX ORDER — MAGNESIUM SULFATE HEPTAHYDRATE 40 MG/ML
2 INJECTION, SOLUTION INTRAVENOUS ONCE
Status: COMPLETED | OUTPATIENT
Start: 2023-02-18 | End: 2023-02-18

## 2023-02-18 RX ORDER — POTASSIUM CHLORIDE 20 MEQ/1
40 TABLET, EXTENDED RELEASE ORAL ONCE
Status: COMPLETED | OUTPATIENT
Start: 2023-02-18 | End: 2023-02-18

## 2023-02-18 RX ADMIN — MAGNESIUM SULFATE 2 G: 2 INJECTION INTRAVENOUS at 01:02

## 2023-02-18 RX ADMIN — LORAZEPAM 1 MG: 2 INJECTION INTRAMUSCULAR; INTRAVENOUS at 11:02

## 2023-02-18 RX ADMIN — POTASSIUM CHLORIDE 20 MEQ: 1500 TABLET, EXTENDED RELEASE ORAL at 08:02

## 2023-02-18 RX ADMIN — POTASSIUM CHLORIDE 40 MEQ: 1500 TABLET, EXTENDED RELEASE ORAL at 01:02

## 2023-02-18 NOTE — ED NOTES
"Patient provided tray for lunch from dietary. Patient remains calm, talkative, and seemingly hyper Protestant. Patient states "This isn't how I really sound. I will sound like myself once God forgives, and the devil and his demons leave me alone." Safety precautions maintained with sitter at the bedside.    "

## 2023-02-18 NOTE — ED PROVIDER NOTES
Encounter Date: 2/18/2023       History     Chief Complaint   Patient presents with    Psychiatric Evaluation     [facility]ia [facility] at request of siblings for SI/VH/AH... erratic behavior during traige     47-year-old male who has known schizophrenia and a significant psych history in our past epic records per my review today.  Here today on an order of protective custody that was placed by his brother.  The concerns by the family or that he was having auditory hallucinations that the devil was telling him malignant things including to harm himself.  The patient at this time is with pressured speech, disorganized thoughts, speaking frequently of hyper Yazidism topics and I am unable to get full HPI from him.  I also called the patient's mother Gosia Ahumada at [phone] to glean more details.  She tells me that he has been acting much the same as the behavior that he has here upon presentation.  In addition, the patient was having malignant hallucinations from an auditory perspective at home as well telling him to either kill himself or harm his mother.  The mother states that he informed her that he had conflicting voices in his head telling him not to harm her as well and that is why he did not act on this per his report.  She also states that he does have this known history of schizophrenia.  At 1 point was taking scheduled injections but due to insurance and cost he has been unable to take those.  Now he is supposed to be on oral medications.  She does not know the name of those.  She does not think that he has been compliant with these at all.  I am unable to get a full HPI, family history, social history, review of systems, medications, and allergies from the patient given his acute mental health condition and altered mental status.    Review of patient's allergies indicates:   Allergen Reactions    No known allergies      Past Medical History:   Diagnosis Date    Hypertension     Schizophrenia      Type 2 diabetes mellitus 5/1/2021     Past Surgical History:   Procedure Laterality Date    ANKLE SURGERY       Family History   Problem Relation Age of Onset    Prostate cancer Neg Hx      Social History     Tobacco Use    Smoking status: Never    Smokeless tobacco: Never   Substance Use Topics    Alcohol use: No    Drug use: No     Review of Systems   Unable to perform ROS: Psychiatric disorder     Physical Exam     Initial Vitals [02/18/23 1113]   BP Pulse Resp Temp SpO2   125/75 110 18 98 °F (36.7 °C) 98 %      MAP       --         Physical Exam    Nursing note and vitals reviewed.  Constitutional: He appears well-developed and well-nourished.   Odd affect. See psych below for further details.    HENT:   Head: Normocephalic and atraumatic.   Nose: Nose normal.   Mouth/Throat: Oropharynx is clear and moist.   Eyes: Conjunctivae and EOM are normal. Pupils are equal, round, and reactive to light.   Neck: Neck supple.   Normal range of motion.  Cardiovascular:  Normal rate, regular rhythm, normal heart sounds and intact distal pulses.     Exam reveals no gallop and no friction rub.       No murmur heard.  Pulmonary/Chest: Breath sounds normal. No respiratory distress. He has no wheezes. He has no rhonchi. He has no rales.   Abdominal: Abdomen is soft. Bowel sounds are normal. He exhibits no distension. There is no abdominal tenderness. There is no rebound and no guarding.   Musculoskeletal:         General: No edema. Normal range of motion.      Cervical back: Normal range of motion and neck supple.     Neurological: He is alert. He has normal strength. No cranial nerve deficit. GCS score is 15. GCS eye subscore is 4. GCS verbal subscore is 5. GCS motor subscore is 6.   See psych below for details.    Skin: Skin is warm and dry. Capillary refill takes less than 2 seconds.   Psychiatric:   The patient has an odd affect.  He is irritable and moving about the room and fidgeting frequently.  His thought content is  very random at this time, very tangential, almost to the point that he has word salad.  He has hyper Tenriism thoughts.  He has malignant hallucinations.  Has positive SI.       ED Course   Procedures  Labs Reviewed   HIV 1 / 2 ANTIBODY   HEPATITIS C ANTIBODY   CBC W/ AUTO DIFFERENTIAL   COMPREHENSIVE METABOLIC PANEL   TSH   URINALYSIS, REFLEX TO URINE CULTURE   DRUG SCREEN PANEL, URINE EMERGENCY   ALCOHOL,MEDICAL (ETHANOL)   ACETAMINOPHEN LEVEL          Imaging Results    None          Medications   LORazepam injection 1 mg (has no administration in time range)     Medical Decision Making:   History:   I obtained history from: someone other than patient.       <> Summary of History: Discussed the patient with his mother, Gosia.   Old Medical Records: I decided to obtain old medical records.  Old Records Summarized: records from clinic visits and records from previous admission(s).       <> Summary of Records: Previous similar evaluations and also interestingly previous hypokalemia.   Initial Assessment:   This is a 48 yo male who has known schizophrenia who presents with malignant auditory hallucinations telling him to harm himself and his mother.   Differential Diagnosis:   We needed to assure that the patient was safe in regards to his current mental state. We also needed to medically clear the patient to facilitate his transfer to a psychiatric facility where he can get further aid.   Clinical Tests:   Lab Tests: Ordered and Reviewed  Medical Tests: Ordered and Reviewed  ED Management:  This is a 47-year-old male who has known schizophrenia who presents to the emergency department today with malignant auditory hallucinations and suicidal ideation.  He also had talked of harming others but stated that he had other voices telling him not to do so.  He initially was a bit irritable and his behavior very fidgety and walking around the room frequently.  We begin his workup with a dose of IV Ativan and subsequently  laboratory studies and EKG to continue our medical clearance of the patient's that he could be transferred to a psychiatric facility.  I also completed a pec after speaking with his mother as well and gaining collateral from her and speaking with the patient and evaluating him.  I am concerned that the patient is a threat to himself and others.  And I do feel that he needs the pec in place for this reason.  The patient has done quite well during his ED stay thus far.  He has eaten a significant amount of food here and states that he had not eaten well prior to arriving.  We also found on his medical clearance labs that his potassium was low.  Per my review in epic it appears that he has presented in much the same way in the past.  I attribute the low-potassium to the fact that he has not been eating well.  We have repleted this and obtain serial potassiums.  He is to the point now that his potassium is around the 2.9-3.1 range.  We have also repleted his magnesium.  At the time of sign-out of care we have 1 more serial potassium pending to assure that this is continuing to improve with our repletion measures.  The patient is in stable condition for transfer to psychiatric facility.  His other laboratory studies have been reviewed by me and are reassuring.  EKG, independently reviewed and interpreted by me, reveals sinus rhythm with some baseline artifact but nonspecific ST and T-wave changes not concerning for acute ischemia or infarction.  The QTC is 440 milliseconds.  The patient has not required any further dosing of benzodiazepines here, just the initial 1 mg of Ativan.  He has been very agreeable and compliant with our plans.  I have informed him of the transfer to the psychiatric facility and he voiced understanding.  ED Diagnosis:  1. Acute malignant hallucinations / psychosis.   2. Acute suicidal ideation.  3. Acute hypokalemia, repleted.   4. Above diagnoses complicated by known h/o schizophrenia.             ED Course as of 02/18/23 2040   Sat Feb 18, 2023 2034 Comprehensive metabolic panel(!!)  K low at 2.7 [NM]   2035 Basic metabolic panel(!)  Repeated at 2.9.  [NM]   2039 Drug screen panel, emergency [NM]   2039 Acetaminophen level(!) [NM]   2039 CBC auto differential  NL   [NM]   2039 Urinalysis, Reflex to Urine Culture Urine, Clean Catch(!)  No infection. [NM]   2039 TSH  TSH normal   [NM]   2040 Ethanol  BAL < 10   [NM]      ED Course User Index  [NM] Flora Nickerson MD                   Clinical Impression:   Final diagnoses:  [Z00.8] Medical clearance for psychiatric admission               Flora Nickerson MD  02/18/23 2109

## 2023-02-18 NOTE — ED NOTES
Patient asked about recent potassium levels. Patient informed that levels have risen and are closer to NL. Patient continues to remain calm in bed watching television. Safety precautions maintained with sitter at the bedside.

## 2023-02-18 NOTE — ED NOTES
Patient is resting in bed responding to internal stimuli whispering upon entering the room. Patient continues to be calm and cooperative. Safety sitter at the bedside with safety precautions maintained. No signs of distress or complaints at this time.

## 2023-02-18 NOTE — ED NOTES
Patient has been searched by security. Placed in hospital scrubs per hospital policy. Belongings have been inventoried and rights have been reviewed. Safety precautions in place. Safety sitter at the bedside with direct view of the patient. Patient is currently calm and visibly responding to internal stimuli. VSS and bed locked with side rails upx2.

## 2023-02-18 NOTE — ED TRIAGE NOTES
Patient arrived to the ED as by JP for psychiatric evaluation. JPSO states patient's siblings are concerned and report the patient having AH/VH/SI. Patient is currently exhibiting erratic behavior upon assessment and endorses speaking with devil.

## 2023-02-18 NOTE — ED NOTES
Patient is currently resting calmly and talking with safety sitter. Crayon and paper provided per request. Patient also provided tray for lunch.

## 2023-02-19 VITALS
HEART RATE: 90 BPM | RESPIRATION RATE: 18 BRPM | SYSTOLIC BLOOD PRESSURE: 120 MMHG | DIASTOLIC BLOOD PRESSURE: 77 MMHG | OXYGEN SATURATION: 97 % | TEMPERATURE: 98 F

## 2023-02-19 NOTE — ED NOTES
I-STAT Chem-8+ Results:   Value Reference Range   Sodium 138 136-145 mmol/L   Potassium  3.1 3.5-5.1 mmol/L   Chloride 93  mmol/L   Ionized Calcium 1.20 1.06-1.42 mmol/L   CO2 (measured) 33 23-29 mmol/L   Glucose 97  mg/dL   BUN 10 6-30 mg/dL   Creatinine 0.9 0.5-1.4 mg/dL   Hematocrit 47 36-54%

## 2023-02-19 NOTE — ED NOTES
Patient is resting quietly in bed. Patient remains calm and cooperative. Safety precautions maintained with the safety sitter at the bedside. Patient has no complaints or signs of distress at this time.   N/A

## 2023-02-19 NOTE — ED NOTES
Patient informed of improvement in potassium levels. Patient remains calm and cooperative however, continues to respond to internal stimuli. Safety precautions maintained with safety sitter at the bedside. No complaints or signs of distress at this time.

## 2023-02-19 NOTE — ED NOTES
Patient provided additional snacks per request. Patient given more potassium as ordered. Patient observed responding to internal stimuli mumbling to self. Patient remains calm and cooperative. Safety precautions maintained with sitter at the bedside.

## 2023-02-19 NOTE — ED NOTES
Assumed care of pt at this time.  RR even and unlabored. Resting in bed comfortably. No voiced compaints of pain or discomfort at this time. Safety protocols remain including 1:1 direct line-of-sight James costa.    General: Awake and alert:  Neck: Supple  Respiratory: Nonlabored respirations. No audible wheeze. Speaking in full sentences.  Cardiac: Well-perfused. No acrocyanosis.  Abdomen: Supple  Neurological: Moves all extremities symmetrically and equally. Answers questions appropriately.

## 2023-02-19 NOTE — ED NOTES
Patient is calm and watching television. Patient has been provided snack per request. Safety sitter remains at the bed side. Safety precautions maintained. No signs of distress or complaints at this time.

## 2023-02-19 NOTE — ED NOTES
Patient continues to respond to internal stimuli while writing and drawing paper at the bedside. Patient remains calm and cooperative. Safety precautions maintained and safety sitter remains at the bedside with direct view of the patient.

## 2023-03-28 NOTE — ED NOTES
Patient further educated on the importance of potassium and magnesium levels. Patient remains calm and cooperative. Safety precautions maintained with sitter at the bedside.    Fluconazole Counseling:  Patient counseled regarding adverse effects of fluconazole including but not limited to headache, diarrhea, nausea, upset stomach, liver function test abnormalities, taste disturbance, and stomach pain.  There is a rare possibility of liver failure that can occur when taking fluconazole.  The patient understands that monitoring of LFTs and kidney function test may be required, especially at baseline. The patient verbalized understanding of the proper use and possible adverse effects of fluconazole.  All of the patient's questions and concerns were addressed.

## 2023-04-14 ENCOUNTER — NURSE TRIAGE (OUTPATIENT)
Dept: ADMINISTRATIVE | Facility: CLINIC | Age: 48
End: 2023-04-14
Payer: COMMERCIAL

## 2023-04-14 NOTE — TELEPHONE ENCOUNTER
Would like to know what anemia is, brief explanataion given re taking OTC iron as he states his provider told him to take vitamins. He states he was told to schedule a colonoscopy. I cannot see referral, states his provider states she faxed it over. Advised to call his provider today to make sure [place] was [place] [facility]. Transferred to  to schedule colonoscopy.   Reason for Disposition   [1] Caller requesting NON-URGENT health information AND [2] PCP's office is the best resource    Protocols used: Information Only Call - No Triage-A-

## 2023-04-18 ENCOUNTER — TELEPHONE (OUTPATIENT)
Dept: ENDOSCOPY | Facility: HOSPITAL | Age: 48
End: 2023-04-18
Payer: COMMERCIAL

## 2023-04-18 DIAGNOSIS — D64.9 ANEMIA, UNSPECIFIED TYPE: Primary | ICD-10-CM

## 2023-04-18 NOTE — TELEPHONE ENCOUNTER
Outside referral received from Dr. [facility] for a colonoscopy. See media tab for records and referral.     Referral for Ambulatory referral/consult to endo  entered.

## 2023-04-24 ENCOUNTER — TELEPHONE (OUTPATIENT)
Dept: ENDOSCOPY | Facility: HOSPITAL | Age: 48
End: 2023-04-24

## 2023-04-24 ENCOUNTER — CLINICAL SUPPORT (OUTPATIENT)
Dept: ENDOSCOPY | Facility: HOSPITAL | Age: 48
End: 2023-04-24
Attending: INTERNAL MEDICINE
Payer: COMMERCIAL

## 2023-04-24 DIAGNOSIS — D64.9 ANEMIA, UNSPECIFIED TYPE: ICD-10-CM

## 2023-05-01 ENCOUNTER — TELEPHONE (OUTPATIENT)
Dept: ENDOSCOPY | Facility: HOSPITAL | Age: 48
End: 2023-05-01
Payer: COMMERCIAL

## 2023-05-01 NOTE — TELEPHONE ENCOUNTER
Called patient to schedule for a colonoscopy. No answer. Left message for patient to call Endoscopy Scheduling to schedule. Phone number provided.

## 2023-07-23 ENCOUNTER — HOSPITAL ENCOUNTER (OUTPATIENT)
Facility: HOSPITAL | Age: 48
Discharge: PSYCHIATRIC OR SUBSTANCE USE FACILITY | End: 2023-07-25
Attending: EMERGENCY MEDICINE | Admitting: HOSPITALIST
Payer: COMMERCIAL

## 2023-07-23 DIAGNOSIS — N17.9 ACUTE KIDNEY INJURY: ICD-10-CM

## 2023-07-23 DIAGNOSIS — R07.9 CHEST PAIN: ICD-10-CM

## 2023-07-23 DIAGNOSIS — E87.6 HYPOKALEMIA: ICD-10-CM

## 2023-07-23 DIAGNOSIS — E83.42 HYPOMAGNESEMIA: ICD-10-CM

## 2023-07-23 DIAGNOSIS — F23 ACUTE PSYCHOSIS: Primary | ICD-10-CM

## 2023-07-23 LAB
ALBUMIN SERPL BCP-MCNC: 4.3 G/DL (ref 3.5–5.2)
ALP SERPL-CCNC: 78 U/L (ref 55–135)
ALT SERPL W/O P-5'-P-CCNC: 22 U/L (ref 10–44)
ANION GAP SERPL CALC-SCNC: 19 MMOL/L (ref 8–16)
APAP SERPL-MCNC: <3 UG/ML (ref 10–20)
AST SERPL-CCNC: 22 U/L (ref 10–40)
BASOPHILS # BLD AUTO: 0.03 K/UL (ref 0–0.2)
BASOPHILS NFR BLD: 0.4 % (ref 0–1.9)
BILIRUB SERPL-MCNC: 1.3 MG/DL (ref 0.1–1)
BUN SERPL-MCNC: 15 MG/DL (ref 6–20)
CALCIUM SERPL-MCNC: 10 MG/DL (ref 8.7–10.5)
CHLORIDE SERPL-SCNC: 98 MMOL/L (ref 95–110)
CO2 SERPL-SCNC: 22 MMOL/L (ref 23–29)
CREAT SERPL-MCNC: 1.6 MG/DL (ref 0.5–1.4)
DIFFERENTIAL METHOD: ABNORMAL
EOSINOPHIL # BLD AUTO: 0 K/UL (ref 0–0.5)
EOSINOPHIL NFR BLD: 0.4 % (ref 0–8)
ERYTHROCYTE [DISTWIDTH] IN BLOOD BY AUTOMATED COUNT: 15.3 % (ref 11.5–14.5)
EST. GFR  (NO RACE VARIABLE): 53.1 ML/MIN/1.73 M^2
ETHANOL SERPL-MCNC: <10 MG/DL
GLUCOSE SERPL-MCNC: 109 MG/DL (ref 70–110)
HCT VFR BLD AUTO: 46.7 % (ref 40–54)
HGB BLD-MCNC: 15.8 G/DL (ref 14–18)
IMM GRANULOCYTES # BLD AUTO: 0.01 K/UL (ref 0–0.04)
IMM GRANULOCYTES NFR BLD AUTO: 0.1 % (ref 0–0.5)
LYMPHOCYTES # BLD AUTO: 2.4 K/UL (ref 1–4.8)
LYMPHOCYTES NFR BLD: 31.8 % (ref 18–48)
MAGNESIUM SERPL-MCNC: 1.5 MG/DL (ref 1.6–2.6)
MCH RBC QN AUTO: 26.7 PG (ref 27–31)
MCHC RBC AUTO-ENTMCNC: 33.8 G/DL (ref 32–36)
MCV RBC AUTO: 79 FL (ref 82–98)
MONOCYTES # BLD AUTO: 1 K/UL (ref 0.3–1)
MONOCYTES NFR BLD: 12.9 % (ref 4–15)
NEUTROPHILS # BLD AUTO: 4.2 K/UL (ref 1.8–7.7)
NEUTROPHILS NFR BLD: 54.4 % (ref 38–73)
NRBC BLD-RTO: 0 /100 WBC
PHOSPHATE SERPL-MCNC: 2.7 MG/DL (ref 2.7–4.5)
PLATELET # BLD AUTO: 311 K/UL (ref 150–450)
PMV BLD AUTO: 9.8 FL (ref 9.2–12.9)
POCT GLUCOSE: 128 MG/DL (ref 70–110)
POTASSIUM SERPL-SCNC: 2.8 MMOL/L (ref 3.5–5.1)
PROT SERPL-MCNC: 8.2 G/DL (ref 6–8.4)
RBC # BLD AUTO: 5.92 M/UL (ref 4.6–6.2)
SODIUM SERPL-SCNC: 139 MMOL/L (ref 136–145)
TSH SERPL DL<=0.005 MIU/L-ACNC: 2.46 UIU/ML (ref 0.4–4)
WBC # BLD AUTO: 7.65 K/UL (ref 3.9–12.7)

## 2023-07-23 PROCEDURE — 99223 1ST HOSP IP/OBS HIGH 75: CPT | Mod: ,,, | Performed by: NURSE PRACTITIONER

## 2023-07-23 PROCEDURE — 63600175 PHARM REV CODE 636 W HCPCS

## 2023-07-23 PROCEDURE — 96365 THER/PROPH/DIAG IV INF INIT: CPT

## 2023-07-23 PROCEDURE — 83735 ASSAY OF MAGNESIUM: CPT | Performed by: EMERGENCY MEDICINE

## 2023-07-23 PROCEDURE — 63600175 PHARM REV CODE 636 W HCPCS: Performed by: NURSE PRACTITIONER

## 2023-07-23 PROCEDURE — 96366 THER/PROPH/DIAG IV INF ADDON: CPT

## 2023-07-23 PROCEDURE — 96361 HYDRATE IV INFUSION ADD-ON: CPT

## 2023-07-23 PROCEDURE — 84100 ASSAY OF PHOSPHORUS: CPT | Performed by: EMERGENCY MEDICINE

## 2023-07-23 PROCEDURE — G0378 HOSPITAL OBSERVATION PER HR: HCPCS

## 2023-07-23 PROCEDURE — 25000003 PHARM REV CODE 250: Performed by: NURSE PRACTITIONER

## 2023-07-23 PROCEDURE — 85025 COMPLETE CBC W/AUTO DIFF WBC: CPT | Performed by: EMERGENCY MEDICINE

## 2023-07-23 PROCEDURE — 82077 ASSAY SPEC XCP UR&BREATH IA: CPT | Performed by: EMERGENCY MEDICINE

## 2023-07-23 PROCEDURE — 80143 DRUG ASSAY ACETAMINOPHEN: CPT | Performed by: EMERGENCY MEDICINE

## 2023-07-23 PROCEDURE — 82962 GLUCOSE BLOOD TEST: CPT

## 2023-07-23 PROCEDURE — 99285 EMERGENCY DEPT VISIT HI MDM: CPT | Mod: 25

## 2023-07-23 PROCEDURE — 96372 THER/PROPH/DIAG INJ SC/IM: CPT | Mod: 59

## 2023-07-23 PROCEDURE — 99223 PR INITIAL HOSPITAL CARE,LEVL III: ICD-10-PCS | Mod: ,,, | Performed by: NURSE PRACTITIONER

## 2023-07-23 PROCEDURE — 25000003 PHARM REV CODE 250: Performed by: EMERGENCY MEDICINE

## 2023-07-23 PROCEDURE — 80053 COMPREHEN METABOLIC PANEL: CPT | Performed by: EMERGENCY MEDICINE

## 2023-07-23 PROCEDURE — 84443 ASSAY THYROID STIM HORMONE: CPT | Performed by: EMERGENCY MEDICINE

## 2023-07-23 RX ORDER — GLUCAGON 1 MG
1 KIT INJECTION
Status: DISCONTINUED | OUTPATIENT
Start: 2023-07-23 | End: 2023-07-25 | Stop reason: HOSPADM

## 2023-07-23 RX ORDER — PALIPERIDONE 3 MG/1
3 TABLET, EXTENDED RELEASE ORAL NIGHTLY
Status: ON HOLD | COMMUNITY
End: 2023-08-01 | Stop reason: HOSPADM

## 2023-07-23 RX ORDER — OLANZAPINE 10 MG/2ML
5 INJECTION, POWDER, FOR SOLUTION INTRAMUSCULAR ONCE AS NEEDED
Status: DISCONTINUED | OUTPATIENT
Start: 2023-07-23 | End: 2023-07-25 | Stop reason: HOSPADM

## 2023-07-23 RX ORDER — NALOXONE HCL 0.4 MG/ML
0.02 VIAL (ML) INJECTION
Status: DISCONTINUED | OUTPATIENT
Start: 2023-07-23 | End: 2023-07-25 | Stop reason: HOSPADM

## 2023-07-23 RX ORDER — NAPROXEN SODIUM 220 MG/1
81 TABLET, FILM COATED ORAL DAILY
Status: DISCONTINUED | OUTPATIENT
Start: 2023-07-24 | End: 2023-07-25 | Stop reason: HOSPADM

## 2023-07-23 RX ORDER — ACETAMINOPHEN 325 MG/1
650 TABLET ORAL EVERY 4 HOURS PRN
Status: DISCONTINUED | OUTPATIENT
Start: 2023-07-23 | End: 2023-07-25 | Stop reason: HOSPADM

## 2023-07-23 RX ORDER — SODIUM CHLORIDE 0.9 % (FLUSH) 0.9 %
10 SYRINGE (ML) INJECTION EVERY 12 HOURS PRN
Status: DISCONTINUED | OUTPATIENT
Start: 2023-07-23 | End: 2023-07-25 | Stop reason: HOSPADM

## 2023-07-23 RX ORDER — IBUPROFEN 200 MG
24 TABLET ORAL
Status: DISCONTINUED | OUTPATIENT
Start: 2023-07-23 | End: 2023-07-25 | Stop reason: HOSPADM

## 2023-07-23 RX ORDER — MIDAZOLAM HYDROCHLORIDE 1 MG/ML
5 INJECTION INTRAMUSCULAR; INTRAVENOUS
Status: COMPLETED | OUTPATIENT
Start: 2023-07-23 | End: 2023-07-23

## 2023-07-23 RX ORDER — MAGNESIUM SULFATE HEPTAHYDRATE 40 MG/ML
2 INJECTION, SOLUTION INTRAVENOUS ONCE
Status: COMPLETED | OUTPATIENT
Start: 2023-07-23 | End: 2023-07-23

## 2023-07-23 RX ORDER — PROCHLORPERAZINE EDISYLATE 5 MG/ML
5 INJECTION INTRAMUSCULAR; INTRAVENOUS EVERY 6 HOURS PRN
Status: DISCONTINUED | OUTPATIENT
Start: 2023-07-23 | End: 2023-07-25 | Stop reason: HOSPADM

## 2023-07-23 RX ORDER — IBUPROFEN 200 MG
16 TABLET ORAL
Status: DISCONTINUED | OUTPATIENT
Start: 2023-07-23 | End: 2023-07-25 | Stop reason: HOSPADM

## 2023-07-23 RX ORDER — INSULIN ASPART 100 [IU]/ML
0-5 INJECTION, SOLUTION INTRAVENOUS; SUBCUTANEOUS
Status: DISCONTINUED | OUTPATIENT
Start: 2023-07-23 | End: 2023-07-25 | Stop reason: HOSPADM

## 2023-07-23 RX ADMIN — MIDAZOLAM HYDROCHLORIDE 5 MG: 1 INJECTION, SOLUTION INTRAMUSCULAR; INTRAVENOUS at 06:07

## 2023-07-23 RX ADMIN — POTASSIUM BICARBONATE 25 MEQ: 978 TABLET, EFFERVESCENT ORAL at 07:07

## 2023-07-23 RX ADMIN — SODIUM CHLORIDE 1000 ML: 0.9 INJECTION, SOLUTION INTRAVENOUS at 09:07

## 2023-07-23 RX ADMIN — SODIUM CHLORIDE, POTASSIUM CHLORIDE, SODIUM LACTATE AND CALCIUM CHLORIDE 1000 ML: 600; 310; 30; 20 INJECTION, SOLUTION INTRAVENOUS at 07:07

## 2023-07-23 RX ADMIN — MIDAZOLAM HYDROCHLORIDE 5 MG: 1 INJECTION, SOLUTION INTRAMUSCULAR; INTRAVENOUS at 05:07

## 2023-07-23 RX ADMIN — MAGNESIUM SULFATE HEPTAHYDRATE 2 G: 40 INJECTION, SOLUTION INTRAVENOUS at 09:07

## 2023-07-24 PROBLEM — R44.3 HALLUCINATIONS: Status: ACTIVE | Noted: 2023-07-24

## 2023-07-24 LAB
ALBUMIN SERPL BCP-MCNC: 3.3 G/DL (ref 3.5–5.2)
ALP SERPL-CCNC: 61 U/L (ref 55–135)
ALT SERPL W/O P-5'-P-CCNC: 18 U/L (ref 10–44)
ANION GAP SERPL CALC-SCNC: 9 MMOL/L (ref 8–16)
AST SERPL-CCNC: 17 U/L (ref 10–40)
BASOPHILS # BLD AUTO: 0.04 K/UL (ref 0–0.2)
BASOPHILS NFR BLD: 0.7 % (ref 0–1.9)
BILIRUB SERPL-MCNC: 0.7 MG/DL (ref 0.1–1)
BUN SERPL-MCNC: 15 MG/DL (ref 6–20)
CALCIUM SERPL-MCNC: 8.9 MG/DL (ref 8.7–10.5)
CHLORIDE SERPL-SCNC: 101 MMOL/L (ref 95–110)
CHOLEST SERPL-MCNC: 141 MG/DL (ref 120–199)
CHOLEST/HDLC SERPL: 3.7 {RATIO} (ref 2–5)
CO2 SERPL-SCNC: 28 MMOL/L (ref 23–29)
CREAT SERPL-MCNC: 0.8 MG/DL (ref 0.5–1.4)
DIFFERENTIAL METHOD: ABNORMAL
EOSINOPHIL # BLD AUTO: 0.1 K/UL (ref 0–0.5)
EOSINOPHIL NFR BLD: 1.4 % (ref 0–8)
ERYTHROCYTE [DISTWIDTH] IN BLOOD BY AUTOMATED COUNT: 15.1 % (ref 11.5–14.5)
EST. GFR  (NO RACE VARIABLE): >60 ML/MIN/1.73 M^2
ESTIMATED AVG GLUCOSE: 114 MG/DL (ref 68–131)
GLUCOSE SERPL-MCNC: 88 MG/DL (ref 70–110)
HBA1C MFR BLD: 5.6 % (ref 4–5.6)
HCT VFR BLD AUTO: 43.5 % (ref 40–54)
HDLC SERPL-MCNC: 38 MG/DL (ref 40–75)
HDLC SERPL: 27 % (ref 20–50)
HGB BLD-MCNC: 14 G/DL (ref 14–18)
IMM GRANULOCYTES # BLD AUTO: 0.02 K/UL (ref 0–0.04)
IMM GRANULOCYTES NFR BLD AUTO: 0.3 % (ref 0–0.5)
LDLC SERPL CALC-MCNC: 94 MG/DL (ref 63–159)
LYMPHOCYTES # BLD AUTO: 1.6 K/UL (ref 1–4.8)
LYMPHOCYTES NFR BLD: 26.5 % (ref 18–48)
MAGNESIUM SERPL-MCNC: 1.8 MG/DL (ref 1.6–2.6)
MCH RBC QN AUTO: 26 PG (ref 27–31)
MCHC RBC AUTO-ENTMCNC: 32.2 G/DL (ref 32–36)
MCV RBC AUTO: 81 FL (ref 82–98)
MONOCYTES # BLD AUTO: 0.9 K/UL (ref 0.3–1)
MONOCYTES NFR BLD: 14.9 % (ref 4–15)
NEUTROPHILS # BLD AUTO: 3.3 K/UL (ref 1.8–7.7)
NEUTROPHILS NFR BLD: 56.2 % (ref 38–73)
NONHDLC SERPL-MCNC: 103 MG/DL
NRBC BLD-RTO: 0 /100 WBC
PLATELET # BLD AUTO: 257 K/UL (ref 150–450)
PMV BLD AUTO: 10.2 FL (ref 9.2–12.9)
POCT GLUCOSE: 104 MG/DL (ref 70–110)
POCT GLUCOSE: 94 MG/DL (ref 70–110)
POCT GLUCOSE: 96 MG/DL (ref 70–110)
POCT GLUCOSE: 98 MG/DL (ref 70–110)
POTASSIUM SERPL-SCNC: 2.9 MMOL/L (ref 3.5–5.1)
PROT SERPL-MCNC: 6.4 G/DL (ref 6–8.4)
RBC # BLD AUTO: 5.39 M/UL (ref 4.6–6.2)
SODIUM SERPL-SCNC: 138 MMOL/L (ref 136–145)
TRIGL SERPL-MCNC: 45 MG/DL (ref 30–150)
WBC # BLD AUTO: 5.89 K/UL (ref 3.9–12.7)

## 2023-07-24 PROCEDURE — 99232 PR SUBSEQUENT HOSPITAL CARE,LEVL II: ICD-10-PCS | Mod: ,,, | Performed by: INTERNAL MEDICINE

## 2023-07-24 PROCEDURE — 99232 SBSQ HOSP IP/OBS MODERATE 35: CPT | Mod: ,,, | Performed by: INTERNAL MEDICINE

## 2023-07-24 PROCEDURE — 90792 PSYCH DIAG EVAL W/MED SRVCS: CPT | Mod: GC,,, | Performed by: PSYCHIATRY & NEUROLOGY

## 2023-07-24 PROCEDURE — 63600175 PHARM REV CODE 636 W HCPCS: Performed by: NURSE PRACTITIONER

## 2023-07-24 PROCEDURE — 83036 HEMOGLOBIN GLYCOSYLATED A1C: CPT | Performed by: NURSE PRACTITIONER

## 2023-07-24 PROCEDURE — 96366 THER/PROPH/DIAG IV INF ADDON: CPT

## 2023-07-24 PROCEDURE — 85025 COMPLETE CBC W/AUTO DIFF WBC: CPT | Performed by: NURSE PRACTITIONER

## 2023-07-24 PROCEDURE — G0378 HOSPITAL OBSERVATION PER HR: HCPCS

## 2023-07-24 PROCEDURE — 80053 COMPREHEN METABOLIC PANEL: CPT | Performed by: NURSE PRACTITIONER

## 2023-07-24 PROCEDURE — 90792 PR PSYCHIATRIC DIAGNOSTIC EVALUATION W/MEDICAL SERVICES: ICD-10-PCS | Mod: GC,,, | Performed by: PSYCHIATRY & NEUROLOGY

## 2023-07-24 PROCEDURE — 83735 ASSAY OF MAGNESIUM: CPT | Performed by: NURSE PRACTITIONER

## 2023-07-24 PROCEDURE — 36415 COLL VENOUS BLD VENIPUNCTURE: CPT | Performed by: NURSE PRACTITIONER

## 2023-07-24 PROCEDURE — 25000003 PHARM REV CODE 250: Performed by: NURSE PRACTITIONER

## 2023-07-24 PROCEDURE — 25000003 PHARM REV CODE 250: Performed by: INTERNAL MEDICINE

## 2023-07-24 PROCEDURE — 80061 LIPID PANEL: CPT | Performed by: NURSE PRACTITIONER

## 2023-07-24 RX ORDER — MAGNESIUM SULFATE 1 G/100ML
1 INJECTION INTRAVENOUS ONCE
Status: COMPLETED | OUTPATIENT
Start: 2023-07-24 | End: 2023-07-24

## 2023-07-24 RX ORDER — PALIPERIDONE 3 MG/1
3 TABLET, EXTENDED RELEASE ORAL DAILY
Status: DISCONTINUED | OUTPATIENT
Start: 2023-07-24 | End: 2023-07-25 | Stop reason: HOSPADM

## 2023-07-24 RX ORDER — SODIUM CHLORIDE 9 MG/ML
INJECTION, SOLUTION INTRAVENOUS CONTINUOUS
Status: ACTIVE | OUTPATIENT
Start: 2023-07-24 | End: 2023-07-24

## 2023-07-24 RX ADMIN — POTASSIUM BICARBONATE 50 MEQ: 978 TABLET, EFFERVESCENT ORAL at 03:07

## 2023-07-24 RX ADMIN — POTASSIUM BICARBONATE 50 MEQ: 978 TABLET, EFFERVESCENT ORAL at 10:07

## 2023-07-24 RX ADMIN — SODIUM CHLORIDE: 0.9 INJECTION, SOLUTION INTRAVENOUS at 03:07

## 2023-07-24 RX ADMIN — PALIPERIDONE 3 MG: 3 TABLET, EXTENDED RELEASE ORAL at 06:07

## 2023-07-24 RX ADMIN — MAGNESIUM SULFATE HEPTAHYDRATE 1 G: 500 INJECTION, SOLUTION INTRAMUSCULAR; INTRAVENOUS at 10:07

## 2023-07-24 NOTE — ED NOTES
Patient Belongings given to security and placed in closet:    1 pair black slippers  1 shirt  1 pair of jeans    [TextBox_4] : Was seen few years ago chest CT left lower lobe atelectasis #1 PET scan negative highly suspicious for a type II open heart surgery/CABG.  Also KALYANI he is poorly compliant with sleep apnea machine even though he got new machine.

## 2023-07-24 NOTE — SUBJECTIVE & OBJECTIVE
Past Medical History:   Diagnosis Date    Hypertension     Schizophrenia     Type 2 diabetes mellitus 5/1/2021       Past Surgical History:   Procedure Laterality Date    ANKLE SURGERY         Review of patient's allergies indicates:   Allergen Reactions    No known allergies        No current facility-administered medications on file prior to encounter.     Current Outpatient Medications on File Prior to Encounter   Medication Sig    aspirin 81 MG Chew Take 1 tablet (81 mg total) by mouth once daily.    atorvastatin (LIPITOR) 20 MG tablet Take 1 tablet (20 mg total) by mouth every evening.    fluphenazine (PROLIXIN) 10 MG tablet Take 1 tablet (10 mg total) by mouth every evening.    hydroCHLOROthiazide (HYDRODIURIL) 50 MG tablet Take 1 tablet (50 mg total) by mouth once daily.    lisinopriL 10 MG tablet Take 1 tablet (10 mg total) by mouth once daily.    metFORMIN (GLUCOPHAGE) 500 MG tablet Take 1 tablet (500 mg total) by mouth 2 (two) times daily.     Family History    None       Tobacco Use    Smoking status: Never    Smokeless tobacco: Never   Substance and Sexual Activity    Alcohol use: No    Drug use: No    Sexual activity: Not Currently     Review of Systems   Constitutional:  Negative for appetite change, chills, diaphoresis, fatigue and fever.   HENT:  Negative for congestion, rhinorrhea and sore throat.    Eyes:  Negative for photophobia and visual disturbance.   Respiratory:  Negative for cough, shortness of breath and wheezing.    Cardiovascular:  Negative for chest pain, palpitations and leg swelling.   Gastrointestinal:  Negative for abdominal distention, abdominal pain, diarrhea, nausea and vomiting.   Genitourinary:  Negative for dysuria, frequency and hematuria.   Musculoskeletal:  Negative for back pain, myalgias and neck pain.   Skin:  Negative for color change, pallor, rash and wound.   Neurological:  Negative for dizziness, syncope, weakness, light-headedness and headaches.    Psychiatric/Behavioral:  Positive for hallucinations. Negative for agitation, confusion, self-injury and suicidal ideas.    Objective:     Vital Signs (Most Recent):  Temp: 98.1 °F (36.7 °C) (07/23/23 1735)  Pulse: 108 (07/23/23 1735)  Resp: 18 (07/23/23 1735)  BP: (!) 107/54 (07/23/23 1735)  SpO2: 99 % (07/23/23 1735) Vital Signs (24h Range):  Temp:  [98.1 °F (36.7 °C)] 98.1 °F (36.7 °C)  Pulse:  [108] 108  Resp:  [18] 18  SpO2:  [99 %] 99 %  BP: (107)/(54) 107/54     Weight: 108.9 kg (240 lb 1.3 oz)  Body mass index is 32.56 kg/m².     Physical Exam  Vitals and nursing note reviewed.   Constitutional:       General: He is not in acute distress.     Appearance: He is obese. He is not toxic-appearing or diaphoretic.   HENT:      Head: Normocephalic and atraumatic.      Nose: Nose normal.      Mouth/Throat:      Mouth: Mucous membranes are dry.   Eyes:      Pupils: Pupils are equal, round, and reactive to light.   Cardiovascular:      Rate and Rhythm: Regular rhythm. Tachycardia present.      Pulses: Normal pulses.   Pulmonary:      Effort: Pulmonary effort is normal. No respiratory distress.      Breath sounds: No wheezing, rhonchi or rales.   Abdominal:      General: Bowel sounds are normal. There is no distension.      Palpations: Abdomen is soft.      Tenderness: There is no abdominal tenderness. There is no guarding.   Musculoskeletal:         General: No swelling, tenderness or deformity. Normal range of motion.      Cervical back: Normal range of motion.   Skin:     General: Skin is warm and dry.      Capillary Refill: Capillary refill takes less than 2 seconds.   Neurological:      General: No focal deficit present.      Mental Status: He is alert and oriented to person, place, and time.      Cranial Nerves: No dysarthria or facial asymmetry.      Motor: No weakness.   Psychiatric:         Attention and Perception: He perceives auditory and visual hallucinations.         Behavior: Behavior is withdrawn.  Behavior is cooperative.         Thought Content: Thought content does not include homicidal or suicidal ideation.         Judgment: Judgment normal.            CRANIAL NERVES     CN III, IV, VI   Pupils are equal, round, and reactive to light.     Significant Labs: All pertinent labs within the past 24 hours have been reviewed.  CBC:   Recent Labs   Lab 07/23/23  1800   WBC 7.65   HGB 15.8   HCT 46.7        CMP:   Recent Labs   Lab 07/23/23  1800      K 2.8*   CL 98   CO2 22*      BUN 15   CREATININE 1.6*   CALCIUM 10.0   PROT 8.2   ALBUMIN 4.3   BILITOT 1.3*   ALKPHOS 78   AST 22   ALT 22   ANIONGAP 19*     TSH:   Recent Labs   Lab 07/23/23  1800   TSH 2.460       Significant Imaging: I have reviewed all pertinent imaging results/findings within the past 24 hours.

## 2023-07-24 NOTE — PLAN OF CARE
No significant changes this shift. Continue with POC and monitor. Pt left lying semi fowlers bed in lowest position, with call light in reach, and all wheels locked X3 rails up.     Problem: Adult Inpatient Plan of Care  Goal: Plan of Care Review  Outcome: Ongoing, Progressing  Goal: Patient-Specific Goal (Individualized)  Outcome: Ongoing, Progressing  Goal: Absence of Hospital-Acquired Illness or Injury  Outcome: Ongoing, Progressing  Goal: Optimal Comfort and Wellbeing  Outcome: Ongoing, Progressing  Goal: Readiness for Transition of Care  Outcome: Ongoing, Progressing     Problem: Diabetes Comorbidity  Goal: Blood Glucose Level Within Targeted Range  Outcome: Ongoing, Progressing     Problem: Fluid and Electrolyte Imbalance (Acute Kidney Injury/Impairment)  Goal: Fluid and Electrolyte Balance  Outcome: Ongoing, Progressing     Problem: Oral Intake Inadequate (Acute Kidney Injury/Impairment)  Goal: Optimal Nutrition Intake  Outcome: Ongoing, Progressing     Problem: Renal Function Impairment (Acute Kidney Injury/Impairment)  Goal: Effective Renal Function  Outcome: Ongoing, Progressing
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
## 2023-07-24 NOTE — ASSESSMENT & PLAN NOTE
Patient is not chronically on statin.. Monitor clinically. Last LDL was   Lab Results   Component Value Date    LDLCALC 137.6 06/24/2014    -Lipid panel pending.

## 2023-07-24 NOTE — ASSESSMENT & PLAN NOTE
"Hallucinations  Patient presented in the custody of [facility]'s office secondary to an order of protective custody and acted by family member for bizarre behavior, "breaking walls, "as well as hallucinations. The patient is calm at the time of my exam. He does endorse seeing and hearing demons but denies breaking anything at home and is unsure why he was sent here. The patient endorses compliance with home medications including invega 3mg nightly. He states reports he was seen by his psychiatrist at [facility] in the past week. The patient was PEC'd by the ED provider and psychiatry was consulted.    -Continue PEC with 1:1 observation.  -Patient reports he takes invega 3mg qhs, will defer restarting until psych evaluates patient.  -PRN zyprexa for agitation  "

## 2023-07-24 NOTE — ASSESSMENT & PLAN NOTE
-Chronic, controlled. BP soft in the ED.  -Hold home hctz and lisinopril.  -Continue to monitor closely.

## 2023-07-24 NOTE — H&P
"[facility] - Emergency Dept  [facility] Medicine  History & Physical    Patient Name: Gómez Ahumada  MRN: [id number]  Patient Class: OP- Observation  Admission Date: 7/23/2023  Attending Physician: Enrico Felipe MD   Primary Care Provider: Kathy Reaves Mai, MD         Patient information was obtained from patient, past medical records, and ER records.     Subjective:     Principal Problem:YVETTE (acute kidney injury)    Chief Complaint:   Chief Complaint   Patient presents with    Psychiatric Evaluation     Hx of schizophrenia. Pt arrives with OPC in place for violent behavior and hallucinations.         HPI: Gómez Ahumada is a 46 yo M with PMHx schizophrenia, HTN, HLD, DM2, and schizophrenia who presents to the ED in the custody of [facility]'s office secondary to an order of protective custody and acted by family member for bizarre behavior, "breaking walls, "as well as hallucinations. The patient is calm at the time of my exam. He does endorse seeing and hearing demons but denies breaking anything at home and is unsure why he was sent here. The patient endorses compliance with home medications including invega 3mg nightly. He states reports he was seen by his psychiatrist at [facility] in the past week. The patient denies any SI/HI. The patient denies any decreased PO intake. He denies fever, chills, N/V/D, SOB, cough, abdominal pain, chest pain, or dysuria.    In the ED, mildly tachycardic and hypotensive improving with IVF, afebrile. CBC unremarkable. K+2.8. Bicarb 22. Anion gap 19. Cr 1.6. Tbili 1.3. Mag 1.5. TSH WNL. Tylenol <3.0. ETOH <10. UA and UDS pending. The patient received versed x2, oral potassium replacement, and an IVF bolus. Pt PEC'd in the ED and psychiatry consulted.      Past Medical History:   Diagnosis Date    Hypertension     Schizophrenia     Type 2 diabetes mellitus 5/1/2021       Past Surgical History:   Procedure Laterality Date    ANKLE SURGERY         Review of patient's allergies " indicates:   Allergen Reactions    No known allergies        No current facility-administered medications on file prior to encounter.     Current Outpatient Medications on File Prior to Encounter   Medication Sig    aspirin 81 MG Chew Take 1 tablet (81 mg total) by mouth once daily.    atorvastatin (LIPITOR) 20 MG tablet Take 1 tablet (20 mg total) by mouth every evening.    fluphenazine (PROLIXIN) 10 MG tablet Take 1 tablet (10 mg total) by mouth every evening.    hydroCHLOROthiazide (HYDRODIURIL) 50 MG tablet Take 1 tablet (50 mg total) by mouth once daily.    lisinopriL 10 MG tablet Take 1 tablet (10 mg total) by mouth once daily.    metFORMIN (GLUCOPHAGE) 500 MG tablet Take 1 tablet (500 mg total) by mouth 2 (two) times daily.     Family History    None       Tobacco Use    Smoking status: Never    Smokeless tobacco: Never   Substance and Sexual Activity    Alcohol use: No    Drug use: No    Sexual activity: Not Currently     Review of Systems   Constitutional:  Negative for appetite change, chills, diaphoresis, fatigue and fever.   HENT:  Negative for congestion, rhinorrhea and sore throat.    Eyes:  Negative for photophobia and visual disturbance.   Respiratory:  Negative for cough, shortness of breath and wheezing.    Cardiovascular:  Negative for chest pain, palpitations and leg swelling.   Gastrointestinal:  Negative for abdominal distention, abdominal pain, diarrhea, nausea and vomiting.   Genitourinary:  Negative for dysuria, frequency and hematuria.   Musculoskeletal:  Negative for back pain, myalgias and neck pain.   Skin:  Negative for color change, pallor, rash and wound.   Neurological:  Negative for dizziness, syncope, weakness, light-headedness and headaches.   Psychiatric/Behavioral:  Positive for hallucinations. Negative for agitation, confusion, self-injury and suicidal ideas.    Objective:     Vital Signs (Most Recent):  Temp: 98.1 °F (36.7 °C) (07/23/23 1735)  Pulse: 108 (07/23/23 1735)  Resp:  18 (07/23/23 1735)  BP: (!) 107/54 (07/23/23 1735)  SpO2: 99 % (07/23/23 1735) Vital Signs (24h Range):  Temp:  [98.1 °F (36.7 °C)] 98.1 °F (36.7 °C)  Pulse:  [108] 108  Resp:  [18] 18  SpO2:  [99 %] 99 %  BP: (107)/(54) 107/54     Weight: 108.9 kg (240 lb 1.3 oz)  Body mass index is 32.56 kg/m².     Physical Exam  Vitals and nursing note reviewed.   Constitutional:       General: He is not in acute distress.     Appearance: He is obese. He is not toxic-appearing or diaphoretic.   HENT:      Head: Normocephalic and atraumatic.      Nose: Nose normal.      Mouth/Throat:      Mouth: Mucous membranes are dry.   Eyes:      Pupils: Pupils are equal, round, and reactive to light.   Cardiovascular:      Rate and Rhythm: Regular rhythm. Tachycardia present.      Pulses: Normal pulses.   Pulmonary:      Effort: Pulmonary effort is normal. No respiratory distress.      Breath sounds: No wheezing, rhonchi or rales.   Abdominal:      General: Bowel sounds are normal. There is no distension.      Palpations: Abdomen is soft.      Tenderness: There is no abdominal tenderness. There is no guarding.   Musculoskeletal:         General: No swelling, tenderness or deformity. Normal range of motion.      Cervical back: Normal range of motion.   Skin:     General: Skin is warm and dry.      Capillary Refill: Capillary refill takes less than 2 seconds.   Neurological:      General: No focal deficit present.      Mental Status: He is alert and oriented to person, place, and time.      Cranial Nerves: No dysarthria or facial asymmetry.      Motor: No weakness.   Psychiatric:         Attention and Perception: He perceives auditory and visual hallucinations.         Behavior: Behavior is withdrawn. Behavior is cooperative.         Thought Content: Thought content does not include homicidal or suicidal ideation.         Judgment: Judgment normal.            CRANIAL NERVES     CN III, IV, VI   Pupils are equal, round, and reactive to light.    "  Significant Labs: All pertinent labs within the past 24 hours have been reviewed.  CBC:   Recent Labs   Lab 07/23/23  1800   WBC 7.65   HGB 15.8   HCT 46.7        CMP:   Recent Labs   Lab 07/23/23  1800      K 2.8*   CL 98   CO2 22*      BUN 15   CREATININE 1.6*   CALCIUM 10.0   PROT 8.2   ALBUMIN 4.3   BILITOT 1.3*   ALKPHOS 78   AST 22   ALT 22   ANIONGAP 19*     TSH:   Recent Labs   Lab 07/23/23  1800   TSH 2.460       Significant Imaging: I have reviewed all pertinent imaging results/findings within the past 24 hours.    Assessment/Plan:     * YVETTE (acute kidney injury)  -Cr 1.6 on admit, bl 0.8-0.9  -s/p 1L IVF bolus that will be followed an additional liter bolus.  -Continue IVF hydration overnight.  -Follow renal panel and electrolytes closely.  -Adjust renal dose medications for Estimated Creatinine Clearance: 72.7 mL/min (A) (based on SCr of 1.6 mg/dL (H)).   -Avoid NSAIDs, Moss-II inhibitors, ACE-I, Angiotensin Receptor Blockers, or Aminoglycosides.  -UA  -Check  Urine Na, Cr, Protein.    Undifferentiated schizophrenia  Hallucinations  Patient presented in the custody of [facility]'s office secondary to an order of protective custody and acted by family member for bizarre behavior, "breaking walls, "as well as hallucinations. The patient is calm at the time of my exam. He does endorse seeing and hearing demons but denies breaking anything at home and is unsure why he was sent here. The patient endorses compliance with home medications including invega 3mg nightly. He states reports he was seen by his psychiatrist at [facility] in the past week. The patient was PEC'd by the ED provider and psychiatry was consulted.    -Continue PEC with 1:1 observation.  -Patient reports he takes invega 3mg qhs, will defer restarting until psych evaluates patient.  -PRN zyprexa for agitation    Hypomagnesemia  -Mag 1.5 on admit.  -WIll replace and monitor on daily " labs.    Hypokalemia  Patient has hypokalemia which is currently uncontrolled. Last electrolytes reviewed- Recent Labs   Lab 07/23/23  1800   K 2.8*   . Will replace potassium and monitor electrolytes closely.     HLD (hyperlipidemia)   Patient is not chronically on statin.. Monitor clinically. Last LDL was   Lab Results   Component Value Date    LDLCALC 137.6 06/24/2014    -Lipid panel pending.    Type 2 diabetes mellitus  Patient's FSGs are controlled on current medication regimen.  Last A1c reviewed-   Lab Results   Component Value Date    HGBA1C 5.5 06/29/2011     Most recent fingerstick glucose reviewed- No results for input(s): POCTGLUCOSE in the last 24 hours.  Current correctional scale  Low  Maintain anti-hyperglycemic dose as follows-   Antihyperglycemics (From admission, onward)      Start     Stop Route Frequency Ordered    07/23/23 2040  insulin aspart U-100 pen 0-5 Units         -- SubQ Before meals & nightly PRN 07/23/23 1940          Hold Oral hypoglycemics while patient is in the hospital.  -Accuchecks AC/HS  -Diabetic/cardiac diet.    HTN (hypertension)  -Chronic, controlled. BP soft in the ED.  -Hold home hctz and lisinopril.  -Continue to monitor closely.      VTE Risk Mitigation (From admission, onward)           Ordered     IP VTE LOW RISK PATIENT  Once         07/23/23 1940     Place sequential compression device  Until discontinued         07/23/23 1940                         On 07/23/2023, patient should be placed in hospital observation services under my care in collaboration with Ant Weiss MD.      Susan Meek, NP  Department of Hospital Medicine  [facility] - Emergency Dept

## 2023-07-24 NOTE — PROGRESS NOTES
"[facility] Medicine  Progress Note    Patient Name: Gómez Ahumada  MRN: [id number]  Patient Class: OP- Observation   Admission Date: 7/23/2023  Length of Stay: 0 days  Attending Physician: Enrico Felipe MD  Primary Care Provider: Kathy Reaves Mai, MD        Subjective:     Principal Problem:YVETTE (acute kidney injury)        HPI:  Gómez Ahumada is a 46 yo M with PMHx schizophrenia, HTN, HLD, DM2, and schizophrenia who presents to the ED in the custody of [facility]'s office secondary to an order of protective custody and acted by family member for bizarre behavior, "breaking walls, "as well as hallucinations. The patient is calm at the time of my exam. He does endorse seeing and hearing demons but denies breaking anything at home and is unsure why he was sent here. The patient endorses compliance with home medications including invega 3mg nightly. He states reports he was seen by his psychiatrist at [facility] in the past week. The patient denies any SI/HI. The patient denies any decreased PO intake. He denies fever, chills, N/V/D, SOB, cough, abdominal pain, chest pain, or dysuria.    In the ED, mildly tachycardic and hypotensive improving with IVF, afebrile. CBC unremarkable. K+2.8. Bicarb 22. Anion gap 19. Cr 1.6. Tbili 1.3. Mag 1.5. TSH WNL. Tylenol <3.0. ETOH <10. UA and UDS pending. The patient received versed x2, oral potassium replacement, and an IVF bolus. Pt PEC'd in the ED and psychiatry consulted.      Overview/Hospital Course:  No notes on file    Interval History: Actively hallucinating, CR is 0.8, psych consult is pending , PEC in place.    Review of Systems  Objective:     Vital Signs (Most Recent):  Temp: 98.3 °F (36.8 °C) (07/24/23 0742)  Pulse: 93 (07/24/23 0742)  Resp: 18 (07/24/23 0742)  BP: 120/66 (07/24/23 0758)  SpO2: (!) 93 % (07/24/23 0742) Vital Signs (24h Range):  Temp:  [98 °F (36.7 °C)-98.3 °F (36.8 °C)] 98.3 °F (36.8 °C)  Pulse:  [] 93  Resp:  [16-18] " 18  SpO2:  [93 %-99 %] 93 %  BP: ()/(54-79) 120/66     Weight: 108.9 kg (240 lb)  Body mass index is 32.55 kg/m².    Intake/Output Summary (Last 24 hours) at 7/24/2023 0917  Last data filed at 7/24/2023 0800  Gross per 24 hour   Intake 240 ml   Output --   Net 240 ml         Physical Exam  Vitals and nursing note reviewed.   Constitutional:       General: He is not in acute distress.     Appearance: He is obese. He is not toxic-appearing or diaphoretic.   HENT:      Head: Normocephalic and atraumatic.      Nose: Nose normal.      Mouth/Throat:      Mouth: Mucous membranes are dry.   Eyes:      Pupils: Pupils are equal, round, and reactive to light.   Cardiovascular:      Rate and Rhythm: Regular rhythm. Tachycardia present.      Pulses: Normal pulses.   Pulmonary:      Effort: Pulmonary effort is normal. No respiratory distress.      Breath sounds: No wheezing, rhonchi or rales.   Abdominal:      General: Bowel sounds are normal. There is no distension.      Palpations: Abdomen is soft.      Tenderness: There is no abdominal tenderness. There is no guarding.   Musculoskeletal:         General: No swelling, tenderness or deformity. Normal range of motion.      Cervical back: Normal range of motion.   Skin:     General: Skin is warm and dry.      Capillary Refill: Capillary refill takes less than 2 seconds.   Neurological:      General: No focal deficit present.      Mental Status: He is alert and oriented to person, place, and time.      Cranial Nerves: No dysarthria or facial asymmetry.      Motor: No weakness.   Psychiatric:         Attention and Perception: He perceives auditory and visual hallucinations.         Behavior: Behavior is withdrawn. Behavior is cooperative.         Thought Content: Thought content does not include homicidal or suicidal ideation.         Judgment: Judgment normal.           Significant Labs: All pertinent labs within the past 24 hours have been reviewed.  BMP:   Recent Labs   Lab  07/24/23  0538   GLU 88      K 2.9*      CO2 28   BUN 15   CREATININE 0.8   CALCIUM 8.9   MG 1.8       Significant Imaging: I have reviewed all pertinent imaging results/findings within the past 24 hours.      Assessment/Plan:      * YVETTE (acute kidney injury)  -Cr 1.6 on admit, bl 0.8-0.9  -s/p 1L IVF bolus that will be followed an additional liter bolus.  -Continue IVF hydration overnight.  -Follow renal panel and electrolytes closely.  -Adjust renal dose medications for Estimated Creatinine Clearance: 72.7 mL/min (A) (based on SCr of 1.6 mg/dL (H)).   -Avoid NSAIDs, Moss-II inhibitors, ACE-I, Angiotensin Receptor Blockers, or Aminoglycosides.  -UA  -Check  Urine Na, Cr, Protein.    Hypomagnesemia  -Mag 1.5 on admit.  -WIll replace and monitor on daily labs.    Hypokalemia  Patient has hypokalemia which is currently uncontrolled. Last electrolytes reviewed- Recent Labs   Lab 07/23/23  1800   K 2.8*   . Will replace potassium and monitor electrolytes closely.     HLD (hyperlipidemia)   Patient is not chronically on statin.. Monitor clinically. Last LDL was   Lab Results   Component Value Date    LDLCALC 137.6 06/24/2014    -Lipid panel pending.    Type 2 diabetes mellitus  Patient's FSGs are controlled on current medication regimen.  Last A1c reviewed-   Lab Results   Component Value Date    HGBA1C 5.5 06/29/2011     Most recent fingerstick glucose reviewed- No results for input(s): POCTGLUCOSE in the last 24 hours.  Current correctional scale  Low  Maintain anti-hyperglycemic dose as follows-   Antihyperglycemics (From admission, onward)    Start     Stop Route Frequency Ordered    07/23/23 2040  insulin aspart U-100 pen 0-5 Units         -- SubQ Before meals & nightly PRN 07/23/23 1940        Hold Oral hypoglycemics while patient is in the hospital.  -Accuchecks AC/HS  -Diabetic/cardiac diet.    Undifferentiated schizophrenia  Hallucinations  Patient presented in the custody of [facility]s office secondary to an order of protective custody and acted by family member for bizarre behavior, "breaking walls, "as well as hallucinations. The patient is calm at the time of my exam. He does endorse seeing and hearing demons but denies breaking anything at home and is unsure why he was sent here. The patient endorses compliance with home medications including invega 3mg nightly. He states reports he was seen by his psychiatrist at [facility] in the past week. The patient was PEC'd by the ED provider and psychiatry was consulted.    -Continue PEC with 1:1 observation.  -Patient reports he takes invega 3mg qhs, will defer restarting until psych evaluates patient.  -PRN zyprexa for agitation    HTN (hypertension)  -Chronic, controlled. BP soft in the ED.  -Hold home hctz and lisinopril.  -Continue to monitor closely.      VTE Risk Mitigation (From admission, onward)         Ordered     IP VTE LOW RISK PATIENT  Once         07/23/23 1940     Place sequential compression device  Until discontinued         07/23/23 1940                Discharge Planning   HARRIS:      Code Status: Full Code   Is the patient medically ready for discharge?:     Reason for patient still in hospital (select all that apply): Patient unstable                     Enrico Felipe MD  Department of Hospital Medicine   [facility] - [facility]    "

## 2023-07-24 NOTE — ASSESSMENT & PLAN NOTE
Patient's FSGs are controlled on current medication regimen.  Last A1c reviewed-   Lab Results   Component Value Date    HGBA1C 5.5 06/29/2011     Most recent fingerstick glucose reviewed- No results for input(s): POCTGLUCOSE in the last 24 hours.  Current correctional scale  Low  Maintain anti-hyperglycemic dose as follows-   Antihyperglycemics (From admission, onward)    Start     Stop Route Frequency Ordered    07/23/23 2040  insulin aspart U-100 pen 0-5 Units         -- SubQ Before meals & nightly PRN 07/23/23 1940        Hold Oral hypoglycemics while patient is in the hospital.  -Accuchecks AC/HS  -Diabetic/cardiac diet.

## 2023-07-24 NOTE — ASSESSMENT & PLAN NOTE
-Cr 1.6 on admit, bl 0.8-0.9  -s/p 1L IVF bolus that will be followed an additional liter bolus.  -Continue IVF hydration overnight.  -Follow renal panel and electrolytes closely.  -Adjust renal dose medications for Estimated Creatinine Clearance: 72.7 mL/min (A) (based on SCr of 1.6 mg/dL (H)).   -Avoid NSAIDs, Moss-II inhibitors, ACE-I, Angiotensin Receptor Blockers, or Aminoglycosides.  -UA  -Check  Urine Na, Cr, Protein.

## 2023-07-24 NOTE — PLAN OF CARE
Patient had an uneventful day.  Problem: Adult Inpatient Plan of Care  Goal: Plan of Care Review  Outcome: Ongoing, Progressing  Goal: Patient-Specific Goal (Individualized)  Outcome: Ongoing, Progressing  Goal: Absence of Hospital-Acquired Illness or Injury  Outcome: Ongoing, Progressing  Goal: Optimal Comfort and Wellbeing  Outcome: Ongoing, Progressing  Goal: Readiness for Transition of Care  Outcome: Ongoing, Progressing     Problem: Diabetes Comorbidity  Goal: Blood Glucose Level Within Targeted Range  Outcome: Ongoing, Progressing     Problem: Fluid and Electrolyte Imbalance (Acute Kidney Injury/Impairment)  Goal: Fluid and Electrolyte Balance  Outcome: Ongoing, Progressing     Problem: Oral Intake Inadequate (Acute Kidney Injury/Impairment)  Goal: Optimal Nutrition Intake  Outcome: Ongoing, Progressing     Problem: Renal Function Impairment (Acute Kidney Injury/Impairment)  Goal: Effective Renal Function  Outcome: Ongoing, Progressing

## 2023-07-24 NOTE — SUBJECTIVE & OBJECTIVE
Interval History: Actively hallucinating, CR is 0.8, psych consult is pending , PEC in place.    Review of Systems  Objective:     Vital Signs (Most Recent):  Temp: 98.3 °F (36.8 °C) (07/24/23 0742)  Pulse: 93 (07/24/23 0742)  Resp: 18 (07/24/23 0742)  BP: 120/66 (07/24/23 0758)  SpO2: (!) 93 % (07/24/23 0742) Vital Signs (24h Range):  Temp:  [98 °F (36.7 °C)-98.3 °F (36.8 °C)] 98.3 °F (36.8 °C)  Pulse:  [] 93  Resp:  [16-18] 18  SpO2:  [93 %-99 %] 93 %  BP: ()/(54-79) 120/66     Weight: 108.9 kg (240 lb)  Body mass index is 32.55 kg/m².    Intake/Output Summary (Last 24 hours) at 7/24/2023 0917  Last data filed at 7/24/2023 0800  Gross per 24 hour   Intake 240 ml   Output --   Net 240 ml         Physical Exam  Vitals and nursing note reviewed.   Constitutional:       General: He is not in acute distress.     Appearance: He is obese. He is not toxic-appearing or diaphoretic.   HENT:      Head: Normocephalic and atraumatic.      Nose: Nose normal.      Mouth/Throat:      Mouth: Mucous membranes are dry.   Eyes:      Pupils: Pupils are equal, round, and reactive to light.   Cardiovascular:      Rate and Rhythm: Regular rhythm. Tachycardia present.      Pulses: Normal pulses.   Pulmonary:      Effort: Pulmonary effort is normal. No respiratory distress.      Breath sounds: No wheezing, rhonchi or rales.   Abdominal:      General: Bowel sounds are normal. There is no distension.      Palpations: Abdomen is soft.      Tenderness: There is no abdominal tenderness. There is no guarding.   Musculoskeletal:         General: No swelling, tenderness or deformity. Normal range of motion.      Cervical back: Normal range of motion.   Skin:     General: Skin is warm and dry.      Capillary Refill: Capillary refill takes less than 2 seconds.   Neurological:      General: No focal deficit present.      Mental Status: He is alert and oriented to person, place, and time.      Cranial Nerves: No dysarthria or facial  asymmetry.      Motor: No weakness.   Psychiatric:         Attention and Perception: He perceives auditory and visual hallucinations.         Behavior: Behavior is withdrawn. Behavior is cooperative.         Thought Content: Thought content does not include homicidal or suicidal ideation.         Judgment: Judgment normal.           Significant Labs: All pertinent labs within the past 24 hours have been reviewed.  BMP:   Recent Labs   Lab 07/24/23  0538   GLU 88      K 2.9*      CO2 28   BUN 15   CREATININE 0.8   CALCIUM 8.9   MG 1.8       Significant Imaging: I have reviewed all pertinent imaging results/findings within the past 24 hours.

## 2023-07-24 NOTE — NURSING
Nurses Note -- 4 Eyes      7/23/2023   11:17 PM      Skin assessed during: Admit      [x] No Altered Skin Integrity Present    []Prevention Measures Documented      [] Yes- Altered Skin Integrity Present or Discovered   [] LDA Added if Not in Epic (Describe Wound)   [] New Altered Skin Integrity was Present on Admit and Documented in LDA   [] Wound Image Taken    Wound Care Consulted? No    Attending Nurse:  Valerie Mcdonald LPN     Second RN/Staff Member:  MARCIE Hines

## 2023-07-24 NOTE — ASSESSMENT & PLAN NOTE
Patient has hypokalemia which is currently uncontrolled. Last electrolytes reviewed- Recent Labs   Lab 07/23/23  1800   K 2.8*   . Will replace potassium and monitor electrolytes closely.

## 2023-07-24 NOTE — HPI
"Gómez Ahumada is a 48 yo M with PMHx schizophrenia, HTN, HLD, DM2, and schizophrenia who presents to the ED in the custody of [facility]'s office secondary to an order of protective custody and acted by family member for bizarre behavior, "breaking walls, "as well as hallucinations. The patient is calm at the time of my exam. He does endorse seeing and hearing demons but denies breaking anything at home and is unsure why he was sent here. The patient endorses compliance with home medications including invega 3mg nightly. He states reports he was seen by his psychiatrist at [facility] in the past week. The patient denies any SI/HI. The patient denies any decreased PO intake. He denies fever, chills, N/V/D, SOB, cough, abdominal pain, chest pain, or dysuria.    In the ED, mildly tachycardic and hypotensive improving with IVF, afebrile. CBC unremarkable. K+2.8. Bicarb 22. Anion gap 19. Cr 1.6. Tbili 1.3. Mag 1.5. TSH WNL. Tylenol <3.0. ETOH <10. UA and UDS pending. The patient received versed x2, oral potassium replacement, and an IVF bolus. Pt PEC'd in the ED and psychiatry consulted.  "

## 2023-07-25 VITALS
HEART RATE: 72 BPM | SYSTOLIC BLOOD PRESSURE: 121 MMHG | BODY MASS INDEX: 32.51 KG/M2 | OXYGEN SATURATION: 93 % | RESPIRATION RATE: 18 BRPM | HEIGHT: 72 IN | DIASTOLIC BLOOD PRESSURE: 62 MMHG | TEMPERATURE: 99 F | WEIGHT: 240 LBS

## 2023-07-25 LAB
ALBUMIN SERPL BCP-MCNC: 3.1 G/DL (ref 3.5–5.2)
ALP SERPL-CCNC: 57 U/L (ref 55–135)
ALT SERPL W/O P-5'-P-CCNC: 19 U/L (ref 10–44)
AMPHET+METHAMPHET UR QL: NEGATIVE
AMPHET+METHAMPHET UR QL: NEGATIVE
ANION GAP SERPL CALC-SCNC: 9 MMOL/L (ref 8–16)
AST SERPL-CCNC: 17 U/L (ref 10–40)
BARBITURATES UR QL SCN>200 NG/ML: NEGATIVE
BARBITURATES UR QL SCN>200 NG/ML: NEGATIVE
BASOPHILS # BLD AUTO: 0.03 K/UL (ref 0–0.2)
BASOPHILS NFR BLD: 0.7 % (ref 0–1.9)
BENZODIAZ UR QL SCN>200 NG/ML: ABNORMAL
BENZODIAZ UR QL SCN>200 NG/ML: ABNORMAL
BILIRUB SERPL-MCNC: 0.7 MG/DL (ref 0.1–1)
BILIRUB UR QL STRIP: NEGATIVE
BUN SERPL-MCNC: 8 MG/DL (ref 6–20)
BZE UR QL SCN: NEGATIVE
BZE UR QL SCN: NEGATIVE
CALCIUM SERPL-MCNC: 8.8 MG/DL (ref 8.7–10.5)
CANNABINOIDS UR QL SCN: NEGATIVE
CANNABINOIDS UR QL SCN: NEGATIVE
CHLORIDE SERPL-SCNC: 101 MMOL/L (ref 95–110)
CLARITY UR REFRACT.AUTO: CLEAR
CO2 SERPL-SCNC: 31 MMOL/L (ref 23–29)
COLOR UR AUTO: YELLOW
CREAT SERPL-MCNC: 0.7 MG/DL (ref 0.5–1.4)
CREAT UR-MCNC: 68 MG/DL (ref 23–375)
DIFFERENTIAL METHOD: ABNORMAL
EOSINOPHIL # BLD AUTO: 0.1 K/UL (ref 0–0.5)
EOSINOPHIL NFR BLD: 1.8 % (ref 0–8)
ERYTHROCYTE [DISTWIDTH] IN BLOOD BY AUTOMATED COUNT: 15.1 % (ref 11.5–14.5)
EST. GFR  (NO RACE VARIABLE): >60 ML/MIN/1.73 M^2
ETHANOL UR-MCNC: <10 MG/DL
GLUCOSE SERPL-MCNC: 87 MG/DL (ref 70–110)
GLUCOSE UR QL STRIP: NEGATIVE
HCT VFR BLD AUTO: 41.8 % (ref 40–54)
HGB BLD-MCNC: 13.5 G/DL (ref 14–18)
HGB UR QL STRIP: NEGATIVE
IMM GRANULOCYTES # BLD AUTO: 0.01 K/UL (ref 0–0.04)
IMM GRANULOCYTES NFR BLD AUTO: 0.2 % (ref 0–0.5)
KETONES UR QL STRIP: NEGATIVE
LEUKOCYTE ESTERASE UR QL STRIP: NEGATIVE
LYMPHOCYTES # BLD AUTO: 1.6 K/UL (ref 1–4.8)
LYMPHOCYTES NFR BLD: 35.7 % (ref 18–48)
MAGNESIUM SERPL-MCNC: 1.6 MG/DL (ref 1.6–2.6)
MCH RBC QN AUTO: 25.8 PG (ref 27–31)
MCHC RBC AUTO-ENTMCNC: 32.3 G/DL (ref 32–36)
MCV RBC AUTO: 80 FL (ref 82–98)
METHADONE UR QL SCN>300 NG/ML: NEGATIVE
METHADONE UR QL SCN>300 NG/ML: NEGATIVE
MONOCYTES # BLD AUTO: 0.6 K/UL (ref 0.3–1)
MONOCYTES NFR BLD: 13.5 % (ref 4–15)
NEUTROPHILS # BLD AUTO: 2.1 K/UL (ref 1.8–7.7)
NEUTROPHILS NFR BLD: 48.1 % (ref 38–73)
NITRITE UR QL STRIP: NEGATIVE
NRBC BLD-RTO: 0 /100 WBC
OPIATES UR QL SCN: NEGATIVE
OPIATES UR QL SCN: NEGATIVE
PCP UR QL SCN>25 NG/ML: NEGATIVE
PCP UR QL SCN>25 NG/ML: NEGATIVE
PH UR STRIP: 8 [PH] (ref 5–8)
PLATELET # BLD AUTO: 257 K/UL (ref 150–450)
PMV BLD AUTO: 10 FL (ref 9.2–12.9)
POCT GLUCOSE: 101 MG/DL (ref 70–110)
POCT GLUCOSE: 87 MG/DL (ref 70–110)
POCT GLUCOSE: 91 MG/DL (ref 70–110)
POTASSIUM SERPL-SCNC: 3.1 MMOL/L (ref 3.5–5.1)
PROT SERPL-MCNC: 6 G/DL (ref 6–8.4)
PROT UR QL STRIP: NEGATIVE
PROT UR-MCNC: <7 MG/DL (ref 0–15)
RBC # BLD AUTO: 5.23 M/UL (ref 4.6–6.2)
SODIUM SERPL-SCNC: 141 MMOL/L (ref 136–145)
SODIUM UR-SCNC: 63 MMOL/L (ref 20–250)
SP GR UR STRIP: 1 (ref 1–1.03)
TOXICOLOGY INFORMATION: ABNORMAL
TOXICOLOGY INFORMATION: ABNORMAL
URN SPEC COLLECT METH UR: NORMAL
WBC # BLD AUTO: 4.37 K/UL (ref 3.9–12.7)

## 2023-07-25 PROCEDURE — 25000003 PHARM REV CODE 250: Performed by: NURSE PRACTITIONER

## 2023-07-25 PROCEDURE — 80307 DRUG TEST PRSMV CHEM ANLYZR: CPT | Performed by: INTERNAL MEDICINE

## 2023-07-25 PROCEDURE — 83735 ASSAY OF MAGNESIUM: CPT | Performed by: NURSE PRACTITIONER

## 2023-07-25 PROCEDURE — 99232 SBSQ HOSP IP/OBS MODERATE 35: CPT | Mod: ,,, | Performed by: INTERNAL MEDICINE

## 2023-07-25 PROCEDURE — 36415 COLL VENOUS BLD VENIPUNCTURE: CPT | Performed by: NURSE PRACTITIONER

## 2023-07-25 PROCEDURE — 96366 THER/PROPH/DIAG IV INF ADDON: CPT

## 2023-07-25 PROCEDURE — 84300 ASSAY OF URINE SODIUM: CPT | Performed by: NURSE PRACTITIONER

## 2023-07-25 PROCEDURE — 99232 PR SUBSEQUENT HOSPITAL CARE,LEVL II: ICD-10-PCS | Mod: ,,, | Performed by: INTERNAL MEDICINE

## 2023-07-25 PROCEDURE — 81003 URINALYSIS AUTO W/O SCOPE: CPT | Mod: 59 | Performed by: INTERNAL MEDICINE

## 2023-07-25 PROCEDURE — G0378 HOSPITAL OBSERVATION PER HR: HCPCS

## 2023-07-25 PROCEDURE — 63600175 PHARM REV CODE 636 W HCPCS: Performed by: INTERNAL MEDICINE

## 2023-07-25 PROCEDURE — 84156 ASSAY OF PROTEIN URINE: CPT | Mod: 59 | Performed by: NURSE PRACTITIONER

## 2023-07-25 PROCEDURE — 25000003 PHARM REV CODE 250: Performed by: INTERNAL MEDICINE

## 2023-07-25 PROCEDURE — 85025 COMPLETE CBC W/AUTO DIFF WBC: CPT | Performed by: NURSE PRACTITIONER

## 2023-07-25 PROCEDURE — 80053 COMPREHEN METABOLIC PANEL: CPT | Performed by: NURSE PRACTITIONER

## 2023-07-25 RX ORDER — MAGNESIUM SULFATE HEPTAHYDRATE 40 MG/ML
2 INJECTION, SOLUTION INTRAVENOUS ONCE
Status: COMPLETED | OUTPATIENT
Start: 2023-07-25 | End: 2023-07-25

## 2023-07-25 RX ADMIN — ASPIRIN 81 MG 81 MG: 81 TABLET ORAL at 08:07

## 2023-07-25 RX ADMIN — PALIPERIDONE 3 MG: 3 TABLET, EXTENDED RELEASE ORAL at 08:07

## 2023-07-25 RX ADMIN — POTASSIUM BICARBONATE 50 MEQ: 978 TABLET, EFFERVESCENT ORAL at 04:07

## 2023-07-25 RX ADMIN — POTASSIUM BICARBONATE 50 MEQ: 978 TABLET, EFFERVESCENT ORAL at 12:07

## 2023-07-25 RX ADMIN — MAGNESIUM SULFATE 2 G: 2 INJECTION INTRAVENOUS at 12:07

## 2023-07-25 NOTE — PROGRESS NOTES
"[facility] Medicine  Progress Note    Patient Name: Gómez Ahumada  MRN: [id number]  Patient Class: OP- Observation   Admission Date: 7/23/2023  Length of Stay: 0 days  Attending Physician: Enrico Felipe MD  Primary Care Provider: Kathy Reaves Mai, MD        Subjective:     Principal Problem:YVETTE (acute kidney injury)        HPI:  Gómez Ahumada is a 46 yo M with PMHx schizophrenia, HTN, HLD, DM2, and schizophrenia who presents to the ED in the custody of [facility]'s office secondary to an order of protective custody and acted by family member for bizarre behavior, "breaking walls, "as well as hallucinations. The patient is calm at the time of my exam. He does endorse seeing and hearing demons but denies breaking anything at home and is unsure why he was sent here. The patient endorses compliance with home medications including invega 3mg nightly. He states reports he was seen by his psychiatrist at [facility] in the past week. The patient denies any SI/HI. The patient denies any decreased PO intake. He denies fever, chills, N/V/D, SOB, cough, abdominal pain, chest pain, or dysuria.    In the ED, mildly tachycardic and hypotensive improving with IVF, afebrile. CBC unremarkable. K+2.8. Bicarb 22. Anion gap 19. Cr 1.6. Tbili 1.3. Mag 1.5. TSH WNL. Tylenol <3.0. ETOH <10. UA and UDS pending. The patient received versed x2, oral potassium replacement, and an IVF bolus. Pt PEC'd in the ED and psychiatry consulted.      Overview/Hospital Course:  No notes on file    Interval History: Patient is medically table for dc to in patient psychiatry. C/W hallucinations.    Review of Systems  Objective:     Vital Signs (Most Recent):  Temp: 98.1 °F (36.7 °C) (07/25/23 0744)  Pulse: 71 (07/25/23 0744)  Resp: 18 (07/25/23 0744)  BP: 126/85 (07/25/23 0744)  SpO2: (!) 94 % (07/25/23 0744) Vital Signs (24h Range):  Temp:  [98 °F (36.7 °C)-99 °F (37.2 °C)] 98.1 °F (36.7 °C)  Pulse:  [65-99] 71  Resp:  [18] " 18  SpO2:  [93 %-98 %] 94 %  BP: (119-143)/(73-97) 126/85     Weight: 108.9 kg (240 lb)  Body mass index is 32.55 kg/m².    Intake/Output Summary (Last 24 hours) at 7/25/2023 0925  Last data filed at 7/24/2023 1734  Gross per 24 hour   Intake 1260 ml   Output 1 ml   Net 1259 ml         Physical Exam  Vitals and nursing note reviewed.   Constitutional:       General: He is not in acute distress.     Appearance: He is obese. He is not toxic-appearing or diaphoretic.   HENT:      Head: Normocephalic and atraumatic.      Nose: Nose normal.      Mouth/Throat:      Mouth: Mucous membranes are dry.   Eyes:      Pupils: Pupils are equal, round, and reactive to light.   Cardiovascular:      Rate and Rhythm: Regular rhythm. Tachycardia present.      Pulses: Normal pulses.   Pulmonary:      Effort: Pulmonary effort is normal. No respiratory distress.      Breath sounds: No wheezing, rhonchi or rales.   Abdominal:      General: Bowel sounds are normal. There is no distension.      Palpations: Abdomen is soft.      Tenderness: There is no abdominal tenderness. There is no guarding.   Musculoskeletal:         General: No swelling, tenderness or deformity. Normal range of motion.      Cervical back: Normal range of motion.   Skin:     General: Skin is warm and dry.      Capillary Refill: Capillary refill takes less than 2 seconds.   Neurological:      General: No focal deficit present.      Mental Status: He is alert and oriented to person, place, and time.      Cranial Nerves: No dysarthria or facial asymmetry.      Motor: No weakness.   Psychiatric:         Attention and Perception: He perceives auditory and visual hallucinations.         Behavior: Behavior is withdrawn. Behavior is cooperative.         Thought Content: Thought content does not include homicidal or suicidal ideation.         Judgment: Judgment normal.           Significant Labs: All pertinent labs within the past 24 hours have been reviewed.  BMP:   Recent Labs    Lab 07/25/23  0519   GLU 87      K 3.1*      CO2 31*   BUN 8   CREATININE 0.7   CALCIUM 8.8   MG 1.6       Significant Imaging: I have reviewed all pertinent imaging results/findings within the past 24 hours.      Assessment/Plan:      * YVETTE (acute kidney injury)  -Cr 1.6 on admit, bl 0.8-0.9  -s/p 1L IVF bolus that will be followed an additional liter bolus.  -Continue IVF hydration overnight.  -Follow renal panel and electrolytes closely.  -Adjust renal dose medications for Estimated Creatinine Clearance: 72.7 mL/min (A) (based on SCr of 1.6 mg/dL (H)).   -Avoid NSAIDs, Moss-II inhibitors, ACE-I, Angiotensin Receptor Blockers, or Aminoglycosides.  -UA  -Check  Urine Na, Cr, Protein.    Hypomagnesemia  -Mag 1.5 on admit.  -WIll replace and monitor on daily labs.    Hypokalemia  Patient has hypokalemia which is currently uncontrolled. Last electrolytes reviewed- Recent Labs   Lab 07/23/23  1800   K 2.8*   . Will replace potassium and monitor electrolytes closely.     HLD (hyperlipidemia)   Patient is not chronically on statin.. Monitor clinically. Last LDL was   Lab Results   Component Value Date    LDLCALC 137.6 06/24/2014    -Lipid panel pending.    Type 2 diabetes mellitus  Patient's FSGs are controlled on current medication regimen.  Last A1c reviewed-   Lab Results   Component Value Date    HGBA1C 5.5 06/29/2011     Most recent fingerstick glucose reviewed- No results for input(s): POCTGLUCOSE in the last 24 hours.  Current correctional scale  Low  Maintain anti-hyperglycemic dose as follows-   Antihyperglycemics (From admission, onward)    Start     Stop Route Frequency Ordered    07/23/23 2040  insulin aspart U-100 pen 0-5 Units         -- SubQ Before meals & nightly PRN 07/23/23 1940        Hold Oral hypoglycemics while patient is in the hospital.  -Accuchecks AC/HS  -Diabetic/cardiac diet.    Undifferentiated schizophrenia  Hallucinations  Patient presented in the custody of [facility]s office secondary to an order of protective custody and acted by family member for bizarre behavior, "breaking walls, "as well as hallucinations. The patient is calm at the time of my exam. He does endorse seeing and hearing demons but denies breaking anything at home and is unsure why he was sent here. The patient endorses compliance with home medications including invega 3mg nightly. He states reports he was seen by his psychiatrist at [facility] in the past week. The patient was PEC'd by the ED provider and psychiatry was consulted.    -Continue PEC with 1:1 observation.  -Patient reports he takes invega 3mg qhs, will defer restarting until psych evaluates patient.  -PRN zyprexa for agitation    HTN (hypertension)  -Chronic, controlled. BP soft in the ED.  -Hold home hctz and lisinopril.  -Continue to monitor closely.      VTE Risk Mitigation (From admission, onward)         Ordered     IP VTE LOW RISK PATIENT  Once         07/23/23 1940     Place sequential compression device  Until discontinued         07/23/23 1940                Discharge Planning   HARRIS: 7/25/2023     Code Status: Full Code   Is the patient medically ready for discharge?: No    Reason for patient still in hospital (select all that apply): Patient unstable                     Enrico Felipe MD  Department of Hospital Medicine   [facility] S[facility]    "

## 2023-07-25 NOTE — PROGRESS NOTES
"CONSULTATION LIAISON PSYCHIATRY PROGRESS NOTE    Patient Name: Gómez Ahumada  MRN: [id number]  Patient Class: OP- Observation  Admission Date: 7/23/2023  Attending Physician: Enrico Felipe MD      SUBJECTIVE:   Gómez Ahumada is a 47 y.o. male with past psychiatric history of schizophrenia & past pertinent medical history of T2DM presents to the ED/admitted to the hospital for YVETTE (acute kidney injury)    Psychiatry consulted for medication management.     Pt seen and chart reviewed. NAEON. No PRN's given for non-redirectable agitation. Invega 3mg daily ordered; pt was compliant with yesterday's and today's dose. Pt likely to be discharged to inpatient psych today once medically cleared and placement is found.     On exam this morning, pt was noted to be lying in bed, in NAD, slightly drowsy but arousable to voice. Oriented x 3. Continues to exhibit disorganized, tangential and perseverative thought process, with bizarre affect and speech pattern. Perseverated on his home meds, and why they weren't being given to him in the hospital / not being given at the exact time he takes them at home. Agreeable to continue taking Invega. Denied any other complaints. Denied SI/HI/AVH, specifically stating that he's "never heard voices or heard the devil; (he just hears) voices in (his) own head."     OBJECTIVE:    Mental Status Exam:  General Appearance: appears stated age, in NAD, overweight, sitting in bed   Behavior:  irritable at times, fidgety  Involuntary Movements and Motor Activity: no abnormal involuntary movements noted; no tics, no tremors, no akathisia, no dystonia, no evidence of tardive dyskinesia; no psychomotor agitation or retardation  Gait and Station: unable to assess - patient lying down or seated  Speech and Language:  bizarre speech pattern placing emphasis on certain words and slowing down/stopping at times, pressured at times   Mood: "Fine"  Affect: bizarre  Thought Process and Associations: " disorganized, tangential, perseverative  Thought Content and Perceptions::  denied SI/HI/AVH. No objective signs of hallucinations on interview.   Sensorium and Orientation: oriented fully (to person, place, and time)  Recent and Remote Memory: Unable to  Formally Assess  Attention and Concentration: easily distractible  Fund of Knowledge: Unable to Formally Assess  Insight: poor awareness of illness  Judgment: limited       CAM ICU positive? no      ASSESSMENT & RECOMMENDATIONS     Schizophrenia spectrum disorder  PSYCH MEDICATIONS  Increase Invega to 6 mg PO daily tomorrow (7/26/23) AM, if pt not yet transferred to inpatient psych facility  PRN Zyprexa 10 mg PO/IM q8h for non-redirectable agitation     RISK ASSESSMENT  NEEDS PEC because patient is gravely disabled. & NEEDS 1:1 sitter     FOLLOW UP  Will follow up while in house     DISPOSITION - once medically cleared:   Seek involuntary inpatient psychiatric admission for stabilization of acute psychiatric symptoms and a safe disposition plan is enacted. The patient &/or their family was informed that the patient will be transferred to an inpatient unit per ED/primary placement team.     Please contact ON CALL psychiatry service (24/7) for any acute issues that may arise.    Dr. Shanelle Cruz   Psychiatry  [facility]  7/25/2023 9:13 AM        --------------------------------------------------------------------------------------------------------------------------------------------------------------------------------------------------------------------------------------    CONTINUED OBJECTIVE clinical data & findings reviewed and noted for above decision making    Current Medications:   Scheduled Meds:    aspirin  81 mg Oral Daily    magnesium sulfate IVPB  2 g Intravenous Once    paliperidone  3 mg Oral Daily    potassium bicarbonate  50 mEq Oral Q4H     PRN Meds: acetaminophen, glucagon (human recombinant), glucose, glucose,  insulin aspart U-100, naloxone, OLANZapine, prochlorperazine, sodium chloride 0.9%    Allergies:   Review of patient's allergies indicates:   Allergen Reactions    No known allergies        Vitals  Vitals:    07/25/23 0744   BP: 126/85   Pulse: 71   Resp: 18   Temp: 98.1 °F (36.7 °C)       Labs/Imaging/Studies:  Recent Results (from the past 24 hour(s))   POCT glucose    Collection Time: 07/24/23 11:27 AM   Result Value Ref Range    POCT Glucose 104 70 - 110 mg/dL   POCT glucose    Collection Time: 07/24/23  4:08 PM   Result Value Ref Range    POCT Glucose 96 70 - 110 mg/dL   POCT glucose    Collection Time: 07/24/23  7:23 PM   Result Value Ref Range    POCT Glucose 98 70 - 110 mg/dL   Comprehensive Metabolic Panel (CMP)    Collection Time: 07/25/23  5:19 AM   Result Value Ref Range    Sodium 141 136 - 145 mmol/L    Potassium 3.1 (L) 3.5 - 5.1 mmol/L    Chloride 101 95 - 110 mmol/L    CO2 31 (H) 23 - 29 mmol/L    Glucose 87 70 - 110 mg/dL    BUN 8 6 - 20 mg/dL    Creatinine 0.7 0.5 - 1.4 mg/dL    Calcium 8.8 8.7 - 10.5 mg/dL    Total Protein 6.0 6.0 - 8.4 g/dL    Albumin 3.1 (L) 3.5 - 5.2 g/dL    Total Bilirubin 0.7 0.1 - 1.0 mg/dL    Alkaline Phosphatase 57 55 - 135 U/L    AST 17 10 - 40 U/L    ALT 19 10 - 44 U/L    eGFR >60.0 >60 mL/min/1.73 m^2    Anion Gap 9 8 - 16 mmol/L   Magnesium    Collection Time: 07/25/23  5:19 AM   Result Value Ref Range    Magnesium 1.6 1.6 - 2.6 mg/dL   CBC with Automated Differential    Collection Time: 07/25/23  5:19 AM   Result Value Ref Range    WBC 4.37 3.90 - 12.70 K/uL    RBC 5.23 4.60 - 6.20 M/uL    Hemoglobin 13.5 (L) 14.0 - 18.0 g/dL    Hematocrit 41.8 40.0 - 54.0 %    MCV 80 (L) 82 - 98 fL    MCH 25.8 (L) 27.0 - 31.0 pg    MCHC 32.3 32.0 - 36.0 g/dL    RDW 15.1 (H) 11.5 - 14.5 %    Platelets 257 150 - 450 K/uL    MPV 10.0 9.2 - 12.9 fL    Immature Granulocytes 0.2 0.0 - 0.5 %    Gran # (ANC) 2.1 1.8 - 7.7 K/uL    Immature Grans (Abs) 0.01 0.00 - 0.04 K/uL    Lymph # 1.6 1.0  - 4.8 K/uL    Mono # 0.6 0.3 - 1.0 K/uL    Eos # 0.1 0.0 - 0.5 K/uL    Baso # 0.03 0.00 - 0.20 K/uL    nRBC 0 0 /100 WBC    Gran % 48.1 38.0 - 73.0 %    Lymph % 35.7 18.0 - 48.0 %    Mono % 13.5 4.0 - 15.0 %    Eosinophil % 1.8 0.0 - 8.0 %    Basophil % 0.7 0.0 - 1.9 %    Differential Method Automated    POCT glucose    Collection Time: 07/25/23  7:11 AM   Result Value Ref Range    POCT Glucose 91 70 - 110 mg/dL     Imaging Results    None

## 2023-07-25 NOTE — SUBJECTIVE & OBJECTIVE
Interval History: Patient is medically table for dc to in patient psychiatry. C/W hallucinations.    Review of Systems  Objective:     Vital Signs (Most Recent):  Temp: 98.1 °F (36.7 °C) (07/25/23 0744)  Pulse: 71 (07/25/23 0744)  Resp: 18 (07/25/23 0744)  BP: 126/85 (07/25/23 0744)  SpO2: (!) 94 % (07/25/23 0744) Vital Signs (24h Range):  Temp:  [98 °F (36.7 °C)-99 °F (37.2 °C)] 98.1 °F (36.7 °C)  Pulse:  [65-99] 71  Resp:  [18] 18  SpO2:  [93 %-98 %] 94 %  BP: (119-143)/(73-97) 126/85     Weight: 108.9 kg (240 lb)  Body mass index is 32.55 kg/m².    Intake/Output Summary (Last 24 hours) at 7/25/2023 0925  Last data filed at 7/24/2023 1734  Gross per 24 hour   Intake 1260 ml   Output 1 ml   Net 1259 ml         Physical Exam  Vitals and nursing note reviewed.   Constitutional:       General: He is not in acute distress.     Appearance: He is obese. He is not toxic-appearing or diaphoretic.   HENT:      Head: Normocephalic and atraumatic.      Nose: Nose normal.      Mouth/Throat:      Mouth: Mucous membranes are dry.   Eyes:      Pupils: Pupils are equal, round, and reactive to light.   Cardiovascular:      Rate and Rhythm: Regular rhythm. Tachycardia present.      Pulses: Normal pulses.   Pulmonary:      Effort: Pulmonary effort is normal. No respiratory distress.      Breath sounds: No wheezing, rhonchi or rales.   Abdominal:      General: Bowel sounds are normal. There is no distension.      Palpations: Abdomen is soft.      Tenderness: There is no abdominal tenderness. There is no guarding.   Musculoskeletal:         General: No swelling, tenderness or deformity. Normal range of motion.      Cervical back: Normal range of motion.   Skin:     General: Skin is warm and dry.      Capillary Refill: Capillary refill takes less than 2 seconds.   Neurological:      General: No focal deficit present.      Mental Status: He is alert and oriented to person, place, and time.      Cranial Nerves: No dysarthria or facial  asymmetry.      Motor: No weakness.   Psychiatric:         Attention and Perception: He perceives auditory and visual hallucinations.         Behavior: Behavior is withdrawn. Behavior is cooperative.         Thought Content: Thought content does not include homicidal or suicidal ideation.         Judgment: Judgment normal.           Significant Labs: All pertinent labs within the past 24 hours have been reviewed.  BMP:   Recent Labs   Lab 07/25/23  0519   GLU 87      K 3.1*      CO2 31*   BUN 8   CREATININE 0.7   CALCIUM 8.8   MG 1.6       Significant Imaging: I have reviewed all pertinent imaging results/findings within the past 24 hours.

## 2023-07-26 PROBLEM — F29 PSYCHOSIS: Status: ACTIVE | Noted: 2023-07-26

## 2023-08-01 PROBLEM — F20.9 SCHIZOPHRENIA: Status: ACTIVE | Noted: 2023-07-26

## 2023-08-02 NOTE — HOSPITAL COURSE
"Gómez Ahumada is a 46 yo M with PMHx schizophrenia, HTN, HLD, DM2, and schizophrenia who presents to the ED in the custody of [facility]'s office secondary to an order of protective custody and acted by family member for bizarre behavior, "breaking walls, "as well as hallucinations. The patient is calm at the time of my exam. He does endorse seeing and hearing demons but denies breaking anything at home and is unsure why he was sent here. The patient endorses compliance with home medications including invega 3mg nightly. He states reports he was seen by his psychiatrist at [facility] in the past week. The patient denies any SI/HI. The patient denies any decreased PO intake. He denies fever, chills, N/V/D, SOB, cough, abdominal pain, chest pain, or dysuria.     In the ED, mildly tachycardic and hypotensive improving with IVF, afebrile. CBC unremarkable. K+2.8. Bicarb 22. Anion gap 19. Cr 1.6. Tbili 1.3. Mag 1.5. TSH WNL. Tylenol <3.0. ETOH <10. UA and UDS pending. The patient received versed x2, oral potassium replacement, and an IVF bolus. Pt PEC'd in the ED and psychiatry consulted.    Will dc to in patient psychiatry.  "

## 2023-08-02 NOTE — DISCHARGE SUMMARY
"[facility] Medicine  Discharge Summary      Patient Name: Gómez Ahumada  MRN: [id number]  BRIAN: [id number]  Patient Class: OP- Observation  Admission Date: 7/23/2023  Hospital Length of Stay: 0 days  Discharge Date and Time: 7/25/2023  8:26 PM  Attending Physician: No att. providers found   Discharging Provider: Enrico Felipe MD  Primary Care Provider: Kathy Reaves Mai, MD  [facility] Medicine Team: [facility] R Enrico Felipe MD  Primary Care Team: [facility] R    HPI:   Gómez Ahumada is a 46 yo M with PMHx schizophrenia, HTN, HLD, DM2, and schizophrenia who presents to the ED in the custody of [facility] secondary to an order of protective custody and acted by family member for bizarre behavior, "breaking walls, "as well as hallucinations. The patient is calm at the time of my exam. He does endorse seeing and hearing demons but denies breaking anything at home and is unsure why he was sent here. The patient endorses compliance with home medications including invega 3mg nightly. He states reports he was seen by his psychiatrist at [facility] in the past week. The patient denies any SI/HI. The patient denies any decreased PO intake. He denies fever, chills, N/V/D, SOB, cough, abdominal pain, chest pain, or dysuria.    In the ED, mildly tachycardic and hypotensive improving with IVF, afebrile. CBC unremarkable. K+2.8. Bicarb 22. Anion gap 19. Cr 1.6. Tbili 1.3. Mag 1.5. TSH WNL. Tylenol <3.0. ETOH <10. UA and UDS pending. The patient received versed x2, oral potassium replacement, and an IVF bolus. Pt PEC'd in the ED and psychiatry consulted.      * No surgery found *      Hospital Course:   Gómez Ahumada is a 46 yo M with PMHx schizophrenia, HTN, HLD, DM2, and schizophrenia who presents to the ED in the custody of [facility] secondary to an order of protective custody and acted by family member for bizarre behavior, "breaking walls, "as well as " hallucinations. The patient is calm at the time of my exam. He does endorse seeing and hearing demons but denies breaking anything at home and is unsure why he was sent here. The patient endorses compliance with home medications including invega 3mg nightly. He states reports he was seen by his psychiatrist at [facility] in the past week. The patient denies any SI/HI. The patient denies any decreased PO intake. He denies fever, chills, N/V/D, SOB, cough, abdominal pain, chest pain, or dysuria.     In the ED, mildly tachycardic and hypotensive improving with IVF, afebrile. CBC unremarkable. K+2.8. Bicarb 22. Anion gap 19. Cr 1.6. Tbili 1.3. Mag 1.5. TSH WNL. Tylenol <3.0. ETOH <10. UA and UDS pending. The patient received versed x2, oral potassium replacement, and an IVF bolus. Pt PEC'd in the ED and psychiatry consulted.    Will dc to in patient psychiatry.       Goals of Care Treatment Preferences:  Code Status: Full Code      Consults:   Consults (From admission, onward)        Status Ordering Provider     Inpatient consult to Psychiatry  Once        Provider:  (Not yet assigned)    Completed SHAWN GÓMEZ          No new Assessment & Plan notes have been filed under this hospital service since the last note was generated.  Service: Hospital Medicine    Final Active Diagnoses:    Diagnosis Date Noted POA    PRINCIPAL PROBLEM:  YVETTE (acute kidney injury) [N17.9] 07/23/2023 Yes    Hallucinations [R44.3] 07/24/2023 Yes    Hypomagnesemia [E83.42] 07/23/2023 Yes    Hypokalemia [E87.6] 05/01/2021 Yes    Type 2 diabetes mellitus [E11.9] 05/01/2021 Yes    Undifferentiated schizophrenia [F20.3] 05/01/2021 Yes    HLD (hyperlipidemia) [E78.5] 05/01/2021 Yes    HTN (hypertension) [I10] 06/24/2014 Yes      Problems Resolved During this Admission:       Discharged Condition: good    Disposition: Psychiatric Hospital    Follow Up:    Patient Instructions:   No discharge procedures on file.    Significant Diagnostic  "Studies: Labs: BMP: No results for input(s): "GLU", "NA", "K", "CL", "CO2", "BUN", "CREATININE", "CALCIUM", "MG" in the last 48 hours.    Pending Diagnostic Studies:     None         Medications:  Reconciled Home Medications:      Medication List      CONTINUE taking these medications    aspirin 81 MG Chew  Take 1 tablet (81 mg total) by mouth once daily.     atorvastatin 20 MG tablet  Commonly known as: LIPITOR  Take 1 tablet (20 mg total) by mouth every evening.     hydroCHLOROthiazide 50 MG tablet  Commonly known as: HYDRODIURIL  Take 1 tablet (50 mg total) by mouth once daily.     metFORMIN 500 MG tablet  Commonly known as: GLUCOPHAGE  Take 1 tablet (500 mg total) by mouth 2 (two) times daily.        STOP taking these medications    fluphenazine 10 MG tablet  Commonly known as: PROLIXIN     lisinopriL 10 MG tablet            Indwelling Lines/Drains at time of discharge:   Lines/Drains/Airways     None                 Time spent on the discharge of patient: 15 minutes         Enrico Felipe MD  Department of Hospital Medicine  [facility] Surg  " walker

## 2023-10-30 PROBLEM — N17.9 AKI (ACUTE KIDNEY INJURY): Status: RESOLVED | Noted: 2023-07-23 | Resolved: 2023-10-30

## 2023-12-01 ENCOUNTER — HOSPITAL ENCOUNTER (EMERGENCY)
Facility: HOSPITAL | Age: 48
Discharge: PSYCHIATRIC OR SUBSTANCE USE FACILITY | End: 2023-12-02
Attending: EMERGENCY MEDICINE
Payer: COMMERCIAL

## 2023-12-01 DIAGNOSIS — E87.6 HYPOKALEMIA: ICD-10-CM

## 2023-12-01 DIAGNOSIS — F20.0 PARANOID SCHIZOPHRENIA: ICD-10-CM

## 2023-12-01 DIAGNOSIS — Z00.8 MEDICAL CLEARANCE FOR PSYCHIATRIC ADMISSION: Primary | ICD-10-CM

## 2023-12-01 DIAGNOSIS — R44.3 HALLUCINATIONS: ICD-10-CM

## 2023-12-01 LAB
ALBUMIN SERPL BCP-MCNC: 4 G/DL (ref 3.5–5.2)
ALP SERPL-CCNC: 69 U/L (ref 55–135)
ALT SERPL W/O P-5'-P-CCNC: 17 U/L (ref 10–44)
AMPHET+METHAMPHET UR QL: NEGATIVE
ANION GAP SERPL CALC-SCNC: 9 MMOL/L (ref 8–16)
APAP SERPL-MCNC: <3 UG/ML (ref 10–20)
AST SERPL-CCNC: 21 U/L (ref 10–40)
BACTERIA #/AREA URNS AUTO: NORMAL /HPF
BARBITURATES UR QL SCN>200 NG/ML: NEGATIVE
BASOPHILS # BLD AUTO: 0.03 K/UL (ref 0–0.2)
BASOPHILS NFR BLD: 0.6 % (ref 0–1.9)
BENZODIAZ UR QL SCN>200 NG/ML: NEGATIVE
BILIRUB SERPL-MCNC: 0.8 MG/DL (ref 0.1–1)
BILIRUB UR QL STRIP: NEGATIVE
BUN SERPL-MCNC: 9 MG/DL (ref 6–20)
BZE UR QL SCN: NEGATIVE
CALCIUM SERPL-MCNC: 9.6 MG/DL (ref 8.7–10.5)
CANNABINOIDS UR QL SCN: NEGATIVE
CHLORIDE SERPL-SCNC: 100 MMOL/L (ref 95–110)
CLARITY UR REFRACT.AUTO: CLEAR
CO2 SERPL-SCNC: 29 MMOL/L (ref 23–29)
COLOR UR AUTO: NORMAL
CREAT SERPL-MCNC: 0.9 MG/DL (ref 0.5–1.4)
CREAT UR-MCNC: 175 MG/DL (ref 23–375)
DIFFERENTIAL METHOD: ABNORMAL
EOSINOPHIL # BLD AUTO: 0.1 K/UL (ref 0–0.5)
EOSINOPHIL NFR BLD: 1.5 % (ref 0–8)
ERYTHROCYTE [DISTWIDTH] IN BLOOD BY AUTOMATED COUNT: 14.3 % (ref 11.5–14.5)
EST. GFR  (NO RACE VARIABLE): >60 ML/MIN/1.73 M^2
ETHANOL SERPL-MCNC: <10 MG/DL
GLUCOSE SERPL-MCNC: 103 MG/DL (ref 70–110)
GLUCOSE UR QL STRIP: NEGATIVE
HCT VFR BLD AUTO: 45.6 % (ref 40–54)
HGB BLD-MCNC: 15.4 G/DL (ref 14–18)
HGB UR QL STRIP: NEGATIVE
IMM GRANULOCYTES # BLD AUTO: 0.01 K/UL (ref 0–0.04)
IMM GRANULOCYTES NFR BLD AUTO: 0.2 % (ref 0–0.5)
KETONES UR QL STRIP: NEGATIVE
LEUKOCYTE ESTERASE UR QL STRIP: NEGATIVE
LYMPHOCYTES # BLD AUTO: 1.6 K/UL (ref 1–4.8)
LYMPHOCYTES NFR BLD: 35 % (ref 18–48)
MCH RBC QN AUTO: 27.5 PG (ref 27–31)
MCHC RBC AUTO-ENTMCNC: 33.8 G/DL (ref 32–36)
MCV RBC AUTO: 81 FL (ref 82–98)
METHADONE UR QL SCN>300 NG/ML: NEGATIVE
MICROSCOPIC COMMENT: NORMAL
MONOCYTES # BLD AUTO: 0.5 K/UL (ref 0.3–1)
MONOCYTES NFR BLD: 11.4 % (ref 4–15)
NEUTROPHILS # BLD AUTO: 2.4 K/UL (ref 1.8–7.7)
NEUTROPHILS NFR BLD: 51.3 % (ref 38–73)
NITRITE UR QL STRIP: NEGATIVE
NRBC BLD-RTO: 0 /100 WBC
OPIATES UR QL SCN: NEGATIVE
PCP UR QL SCN>25 NG/ML: NEGATIVE
PH UR STRIP: 6 [PH] (ref 5–8)
PLATELET # BLD AUTO: 223 K/UL (ref 150–450)
PMV BLD AUTO: 9.6 FL (ref 9.2–12.9)
POTASSIUM SERPL-SCNC: 2.8 MMOL/L (ref 3.5–5.1)
PROT SERPL-MCNC: 7.4 G/DL (ref 6–8.4)
PROT UR QL STRIP: NEGATIVE
RBC # BLD AUTO: 5.6 M/UL (ref 4.6–6.2)
RBC #/AREA URNS AUTO: 1 /HPF (ref 0–4)
SODIUM SERPL-SCNC: 138 MMOL/L (ref 136–145)
SP GR UR STRIP: 1.01 (ref 1–1.03)
SQUAMOUS #/AREA URNS AUTO: 0 /HPF
TOXICOLOGY INFORMATION: NORMAL
TSH SERPL DL<=0.005 MIU/L-ACNC: 1.7 UIU/ML (ref 0.4–4)
URN SPEC COLLECT METH UR: NORMAL
WBC # BLD AUTO: 4.66 K/UL (ref 3.9–12.7)
WBC #/AREA URNS AUTO: 0 /HPF (ref 0–5)

## 2023-12-01 PROCEDURE — 84443 ASSAY THYROID STIM HORMONE: CPT | Performed by: EMERGENCY MEDICINE

## 2023-12-01 PROCEDURE — 80053 COMPREHEN METABOLIC PANEL: CPT | Performed by: EMERGENCY MEDICINE

## 2023-12-01 PROCEDURE — 82077 ASSAY SPEC XCP UR&BREATH IA: CPT | Performed by: EMERGENCY MEDICINE

## 2023-12-01 PROCEDURE — 85025 COMPLETE CBC W/AUTO DIFF WBC: CPT | Performed by: EMERGENCY MEDICINE

## 2023-12-01 PROCEDURE — 81001 URINALYSIS AUTO W/SCOPE: CPT | Mod: XB | Performed by: EMERGENCY MEDICINE

## 2023-12-01 PROCEDURE — 80307 DRUG TEST PRSMV CHEM ANLYZR: CPT | Performed by: EMERGENCY MEDICINE

## 2023-12-01 PROCEDURE — 99285 EMERGENCY DEPT VISIT HI MDM: CPT

## 2023-12-01 PROCEDURE — 80143 DRUG ASSAY ACETAMINOPHEN: CPT | Performed by: EMERGENCY MEDICINE

## 2023-12-01 RX ORDER — POTASSIUM CHLORIDE 20 MEQ/1
20 TABLET, EXTENDED RELEASE ORAL ONCE
Status: DISCONTINUED | OUTPATIENT
Start: 2023-12-01 | End: 2023-12-02 | Stop reason: HOSPADM

## 2023-12-02 VITALS
SYSTOLIC BLOOD PRESSURE: 108 MMHG | BODY MASS INDEX: 30.24 KG/M2 | WEIGHT: 223 LBS | RESPIRATION RATE: 16 BRPM | OXYGEN SATURATION: 97 % | DIASTOLIC BLOOD PRESSURE: 57 MMHG | TEMPERATURE: 99 F | HEART RATE: 65 BPM

## 2023-12-02 PROBLEM — Z13.9 ENCOUNTER FOR MEDICAL SCREENING EXAMINATION: Status: ACTIVE | Noted: 2023-12-02

## 2023-12-02 NOTE — ED NOTES
Spoke w/ Ellie @ [facility] to inform that patient has departed. Informed that patient did not receive his potassium supplements. Ellie tatum will administer potassium once he gets to their facility,.

## 2023-12-02 NOTE — ED NOTES
The patient is recv'd lying supine in bed w/ eyes closed, rise/fall of chest noted. The bed is maintained in the lowest locked level w/ both side rails in the upright position. He is attired in [facility] provided scrubs w/ fair grooming & hygiene. DVC is maintained w/ sitter present. Awaiting transfer.

## 2023-12-02 NOTE — ED TRIAGE NOTES
"Pt presents to the ED for psychiatric evaluation. Patient is a poor historian and is unsure about why he was sent to the hospital, stating "you'd have to ask my mother." Patient denies SI/HI and auditory/visual hallucinations at this time.  "

## 2023-12-02 NOTE — ED PROVIDER NOTES
"Encounter Date: 12/1/2023       History     Chief Complaint   Patient presents with    OPC     Brother called police, Pt exhibiting poor hygiene, stating, "the devil is commanding me to harm myself and kill my mother." Hx of schizophrenia, unknown compliance with medications.      HPI  Gómez Ahumada is a 48-year-old male with a history of hypertension, schizophrenia, type 2 diabetes presenting via police for OPC and psychiatric evaluation.  Per OPC, brother called the police as patient has been exhibiting poor hygiene stating the devil is commanding me to kill my mother he has a history of schizophrenia with unknown compliance with medications.  Much of the history is obtained through medical records, OPC and previous history.  Patient is withdrawn appearing, but denies any HI, SI or hallucinations at this time.    Review of patient's allergies indicates:   Allergen Reactions    No known allergies      Past Medical History:   Diagnosis Date    Hypertension     Schizophrenia     Type 2 diabetes mellitus 5/1/2021     Past Surgical History:   Procedure Laterality Date    ANKLE SURGERY       Family History   Problem Relation Age of Onset    Prostate cancer Neg Hx      Social History     Tobacco Use    Smoking status: Never    Smokeless tobacco: Never   Substance Use Topics    Alcohol use: No    Drug use: No     Review of Systems  All other systems reviewed and were negative; see HPI also for additional ROS.    Physical Exam     Initial Vitals [12/01/23 1930]   BP Pulse Resp Temp SpO2   (!) 150/82 94 16 98.6 °F (37 °C) 100 %      MAP       --         Physical Exam    Nursing note and vitals reviewed.      Gen/Constitutional: Interactive.  Moderate emotional distress  Head: Normocephalic, Atraumatic  Neck: supple, no masses or LAD, no JVD  Eyes: PERRLA, conjunctiva clear  Ears, Nose and Throat: No rhinorrhea or stridor.  Cardiac:  Regular rate, Reg Rhythm, No murmur  Pulmonary: CTA Bilat, no wheezes, rhonchi, rales.  " No increased work of breathing.  GI: Abdomen soft, non-tender, non-distended; no rebound or guarding  : No CVA tenderness.  Musculoskeletal: Extremities warm, well perfused, no erythema, no edema  Skin: No rashes, cyanosis or jaundice.  Neuro: Alert and Oriented x 3; No focal motor or sensory deficits.    Psych:  Rapid, pressured speech, withdrawn appearing, tangential thought      ED Course   Procedures  Labs Reviewed   CBC W/ AUTO DIFFERENTIAL - Abnormal; Notable for the following components:       Result Value    MCV 81 (*)     All other components within normal limits   COMPREHENSIVE METABOLIC PANEL - Abnormal; Notable for the following components:    Potassium 2.8 (*)     All other components within normal limits   ACETAMINOPHEN LEVEL - Abnormal; Notable for the following components:    Acetaminophen (Tylenol), Serum <3.0 (*)     All other components within normal limits   TSH   ALCOHOL,MEDICAL (ETHANOL)   URINALYSIS, REFLEX TO URINE CULTURE   DRUG SCREEN PANEL, URINE EMERGENCY   URINALYSIS MICROSCOPIC          Imaging Results    None          Medications   potassium bicarbonate disintegrating tablet 50 mEq (has no administration in time range)   potassium chloride SA CR tablet 20 mEq (has no administration in time range)     Medical Decision Making  Gómez Ahumada is a 48-year-old male with a history of hypertension, schizophrenia, type 2 diabetes presenting via police for OPC and psychiatric evaluation.     Amount and/or Complexity of Data Reviewed  Independent Historian:      Details: Police -   External Data Reviewed: notes.  Labs: ordered.    Risk  OTC drugs.  Prescription drug management.  Parenteral controlled substances.  Diagnosis or treatment significantly limited by social determinants of health.    Currently afebrile vital signs are stable.  Physical exam findings remarkable for tangential thought, rapid pressured speech, disorganized thought, and poor insight.  He denies any HI or SI.  OPC was  provided by police prior to arrival.  Per OPC, patient exhibiting poor hygiene, command hallucinations including noncompliance with medications.  Pec was obtained, secondary to previous OPC.  Laboratory clearance for medical clearance is obtained.  Initial labs show hypokalemia with a K of 2.8.  Patient was given potassium repletion, asymptomatic without chest pain, palpitations arrhythmia.  Physical exam findings otherwise unremarkable.  Urine drug screen, UA pending at the time of sign-out.                                  Clinical Impression:  Final diagnoses:  [Z00.8] Medical clearance for psychiatric admission (Primary)  [F20.0] Paranoid schizophrenia  [R44.3] Hallucinations  [E87.6] Hypokalemia          ED Disposition Condition    Transfer to Psych Facility Stable          ED Prescriptions    None       Follow-up Information    None         Star Mabry DO, FAAEM  Emergency Staff Physician   Dept of Emergency Medicine   [facility]  Spectralink: [id number]        Disclaimer: This note has been generated using voice-recognition software. There may be typographical errors that have been missed during proof-reading.       Star Mabry DO  12/01/23 [id number]

## 2023-12-02 NOTE — ED NOTES
The patient is escorted off the unit via w/c to [facility] vehicle w/ hospital security present. All belongings are given to [facility] staff. He departed w/o any concerns. He did not wish for anyone to be notified of his departure.

## 2024-03-04 PROBLEM — Z13.9 ENCOUNTER FOR MEDICAL SCREENING EXAMINATION: Status: RESOLVED | Noted: 2023-12-02 | Resolved: 2024-03-04

## 2024-06-28 ENCOUNTER — HOSPITAL ENCOUNTER (OUTPATIENT)
Dept: RADIOLOGY | Facility: HOSPITAL | Age: 49
Discharge: HOME | End: 2024-06-28
Attending: ORTHOPAEDIC SURGERY
Payer: MEDICARE

## 2024-06-28 ENCOUNTER — OFFICE VISIT (OUTPATIENT)
Dept: ORTHOPEDICS | Facility: CLINIC | Age: 49
End: 2024-06-28
Payer: MEDICARE

## 2024-06-28 DIAGNOSIS — M19.072 ARTHRITIS OF ANKLE, LEFT: Primary | ICD-10-CM

## 2024-06-28 DIAGNOSIS — R52 PAIN: ICD-10-CM

## 2024-06-28 PROCEDURE — 99999 PR PBB SHADOW E&M-EST. PATIENT-LVL II: CPT | Mod: PBBFAC,,, | Performed by: ORTHOPAEDIC SURGERY

## 2024-06-28 PROCEDURE — 1159F MED LIST DOCD IN RCRD: CPT | Mod: CPTII,S$GLB,, | Performed by: ORTHOPAEDIC SURGERY

## 2024-06-28 PROCEDURE — 1160F RVW MEDS BY RX/DR IN RCRD: CPT | Mod: CPTII,S$GLB,, | Performed by: ORTHOPAEDIC SURGERY

## 2024-06-28 PROCEDURE — 73610 X-RAY EXAM OF ANKLE: CPT | Mod: TC,LT

## 2024-06-28 PROCEDURE — 4010F ACE/ARB THERAPY RXD/TAKEN: CPT | Mod: CPTII,S$GLB,, | Performed by: ORTHOPAEDIC SURGERY

## 2024-06-28 PROCEDURE — 73610 X-RAY EXAM OF ANKLE: CPT | Mod: 26,LT,, | Performed by: INTERNAL MEDICINE

## 2024-06-28 PROCEDURE — 99203 OFFICE O/P NEW LOW 30 MIN: CPT | Mod: S$GLB,,, | Performed by: ORTHOPAEDIC SURGERY

## 2024-06-28 NOTE — PROGRESS NOTES
DATE: 6/28/2024  PATIENT: Gómez Ahumada    CHIEF COMPLAINT:  Left ankle cracking    HISTORY:  Gómez Ahumada is a 48 y.o. male with a PMH of left ankle fracture s/p ORIF around 25 years ago, schizophrenia and DM here for evaluation of left ankle crackling.  States he has no recent injury or trauma to the left ankle. He is worried that he is breaking his bone when he hears the cracking in his ankle.  He says the cracking occurs posteriorly.  He has no pain with ambulation and no pain at rest.  Denies any numbness or tingling.  He is asking for a pair of crutches today to prevent him from fracturing his ankle in the future.      PAST MEDICAL/SURGICAL HISTORY:  Past Medical History:   Diagnosis Date    Hypertension     Schizophrenia     Type 2 diabetes mellitus 5/1/2021     Past Surgical History:   Procedure Laterality Date    ANKLE SURGERY         Current Medications:   Current Outpatient Medications:     amLODIPine (NORVASC) 10 MG tablet, Take 10 mg by mouth., Disp: , Rfl:     aspirin 81 MG Chew, Take 1 tablet (81 mg total) by mouth once daily., Disp: 30 tablet, Rfl: 1    atorvastatin (LIPITOR) 20 MG tablet, Take 2 tablets (40 mg total) by mouth every evening., Disp: 30 tablet, Rfl: 1    lisinopriL (PRINIVIL,ZESTRIL) 20 MG tablet, Take 1 tablet (20 mg total) by mouth once daily., Disp: 90 tablet, Rfl: 3    metFORMIN (GLUCOPHAGE) 500 MG tablet, Take 1 tablet (500 mg total) by mouth 2 (two) times daily., Disp: 60 tablet, Rfl: 1    pantoprazole (PROTONIX) 40 MG tablet, Take 1 tablet (40 mg total) by mouth once daily., Disp: 30 tablet, Rfl: 0    traZODone (DESYREL) 50 MG tablet, Take 1 tablet (50 mg total) by mouth every evening., Disp: 30 tablet, Rfl: 0    Social History:   Social History     Socioeconomic History    Marital status: Single   Tobacco Use    Smoking status: Never    Smokeless tobacco: Never   Substance and Sexual Activity    Alcohol use: No    Drug use: No    Sexual activity: Not Currently   Other Topics  Concern    Patient feels they ought to cut down on drinking/drug use No    Patient annoyed by others criticizing their drinking/drug use No    Patient has felt bad or guilty about drinking/drug use No    Patient has had a drink/used drugs as an eye opener in the AM No       REVIEW OF SYSTEMS:  Constitution: Negative. Negative for chills, fever and night sweats.   Cardiovascular: Negative for chest pain and syncope.   Respiratory: Negative for cough and shortness of breath.   Gastrointestinal: See HPI. Negative for nausea/vomiting. Negative for abdominal pain.  Genitourinary: See HPI. Negative for discoloration or dysuria.  Skin: Negative for dry skin, itching and rash.   Hematologic/Lymphatic: Negative for bleeding problem. Does not bruise/bleed easily.   Musculoskeletal: Negative for falls and muscle weakness.   Neurological: See HPI. No seizures.   Endocrine: Negative for polydipsia, polyphagia and polyuria.   Allergic/Immunologic: Negative for hives and persistent infections.    PHYSICAL EXAMINATION:    There were no vitals taken for this visit.    General: The patient is a 48 y.o. male in no apparent distress, the patient is oriented to person, place and time.   Psych: Normal mood and affect  HEENT:  NCAT, sclera nonicteric  Lungs:  Respirations are equal and unlabored.  CV:  2+ bilateral upper and lower extremity pulses.  Skin:  Intact throughout.  Musculoskeletal: No pain with the range of motion of the bilateral hips. No trochanteric tenderness to palpation. No pain with range of motion about the bilateral knees.      Left Foot and Ankle Exam    INSPECTION:        Gait:    Antalgic gait favoring the left   Scars:   Scars over anterior and lateral ankle   Swelling:  Mild swelling about ankle   Color:   Normal   Atrophy:  None  Heel / Toe Walking: No difficulty    ALIGNMENT:    Hindfoot  Normal    Midfoot: Loss of medial arch  Forefoot: Normal     Collective Ankle-Hindfoot Alignment    Good -plantigrade (PG),  well aligned         TENDERNESS:  LATERAL:      Sinus tarsi:  None    Syndesmosis:  none    ATFL:   none     CFL:   none    Anterolateral gutter: none    Fibula:   none  Peroneal tendons: none    Peroneal tubercle.  None     ANTERIOR:  Anteromedial joint line:  none  Anterolateral joint line:  None  Talonavicular:    None  Anterior tibialis:   none  Extensor tendons:   none    POSTERIOR:  Medial/lateral achilles:   none  Medial/lateral achilles insertion: None    MEDIAL:      Deltoid:  none    Malleolus:  none    PTT:   none    Navicular:  none           CALCANEUS:  Retrocalcaneal:   none  Medial achilles:   None  Lateral achilles:   None  Calcaneal tuberosity:   None    FOOT:    Calcaneal cuboid  none   MT / MT heads:  none   Navicular   none    Medial cord origin PF:  none  Cuneiforms:   none    Web space:   none  Lisfranc    none    Tarsal tunnel:   none  Base of the fifth metatarsal  none   Tinels sign   neg        RANGE OF MOTION:  RIGHT/ LEFT      Ankle DF/PF:  15/45  5/20         Eversion/Inversion: 15/25 15/25     Midfoot ABD/ADD: 10/10 10/10     First MTP DF/PF: 60/25 60/25              (* = pain)                  STRENGTH: (affected)  Anterior tibialis: 5/5  Posterior tibialis: 5/5  Gastroc-soleus: 5/5  Peroneals:  5/5  EHL:   5/5  FHL:   5/5    (* = pain)    SPECIAL TESTS:   ANKLE INSTABILITY: (*pain)    Anterior drawer:   Normal      (C-W contralateral side)     Inversion:   30°     Eversion  10°            Collective Instability: (Ant-post and varus-valgus)     Stable        PROVOCATIVE TESTING:    Forced DF/ER: No pain at syndesmosis.    Mid-leg squeeze  No pain at syndesmosis    Forced DF:  No pain anterior joint line.      Forced PF:  No pain posterior ankle.     Forced INV:  No pain lateral    Forced EV:  No pain medial     Rutledges sign: Normal ankle plantar flexion.     Resisted peroneal No subluxation or pain    1st-2nd MT toggle No pain at Lisfranc    MT-T torque  No pain at  Lisfranc     NEUROLOGIC TESTING:  All dermatomes foot, ankle and leg have normal sensation light touch  Ankle Reflexes 2+, symmetric   Negative Babinski and No Clonus    VASCULAR:  2+ pulses PT/DT with brisk capillary refill toes.        IMAGING:     Radiographs of the left ankle were ordered and personally reviewed with the patient today.  End-stage arthritis of the tibiotalar joint.  Pes planus.  Hardware appears intact.        ASSESSMENT/PLAN:    Gómez was seen today for pain.    Diagnoses and all orders for this visit:    Arthritis of ankle, left, posttraumatic  Comments:  ORIF 20+ years ago  Orders:  -     X-Ray Ankle Complete Left; Future        48 y.o. yo male with posttraumatic arthritis of the left ankle.  Patient has no pain at rest or with ambulation.  Hardware appears intact and x-rays.  Patient was worried that he will injuries ankle in the future and is asking for an assistive device for ambulation.    Plan: The patient and I had a thorough discussion today.  We discussed the working diagnosis as well as several other potential alternative diagnoses.  Treatment options were discussed, both conservative and surgical.  Conservative treatment options would include things such as activity modifications, a period of rest, tylenol, anti-inflammatory medications, physical therapy, immobilization, corticosteroid injections, and others.     At this time, the patient would like to proceed with a quad cane.    Return to clinic p.r.n.    I have personally taken the history and examined this patient and agree with the residents note as stated above.

## 2024-09-13 NOTE — ED PROVIDER NOTES
"Encounter Date: 7/23/2023       History     Chief Complaint   Patient presents with    Psychiatric Evaluation     Hx of schizophrenia. Pt arrives with OPC in place for violent behavior and hallucinations.      47-year-old man with comorbidities of schizophrenia and diabetes presents to the emergency department in the custody of [facility]'s office secondary to an order of protective custody and acted by family member for bizarre behavior, "breaking walls, "as well as hallucinations.  In the emergency department, the patient is exhibiting pressured, tangential, and hyper Taoist speech stating that he is hearing and seeing demons without associated suicidal or homicidal ideation.  The patient reports medication compliance, but is unable/unwilling to identify those medications.  He denies any recent alcohol or drug use.    Review of patient's allergies indicates:   Allergen Reactions    No known allergies      Past Medical History:   Diagnosis Date    Hypertension     Schizophrenia     Type 2 diabetes mellitus 5/1/2021     Past Surgical History:   Procedure Laterality Date    ANKLE SURGERY       Family History   Problem Relation Age of Onset    Prostate cancer Neg Hx      Social History     Tobacco Use    Smoking status: Never    Smokeless tobacco: Never   Substance Use Topics    Alcohol use: No    Drug use: No     Review of Systems    Physical Exam     Initial Vitals [07/23/23 1735]   BP Pulse Resp Temp SpO2   (!) 107/54 108 18 98.1 °F (36.7 °C) 99 %      MAP       --         Physical Exam    Vitals reviewed.  Constitutional:   47-year-old  man, agitated, acutely psychotic   HENT:   Head: Normocephalic and atraumatic.   Tolerating secretions   Eyes: EOM are normal.   Neck: No tracheal deviation present.   Cardiovascular:            Mild tachycardia is noted with intact distal pulses   Pulmonary/Chest: No stridor. No respiratory distress. He has no wheezes.   Abdominal:   Obese, " Time: 10:19 AM EDT  Date of encounter:  9/13/2024  Independent Historian/Clinical History and Information was obtained by:   Patient    History is limited by: N/A    Chief Complaint: Syncope and Dizziness      History of Present Illness:  Patient is a 50 y.o. year old female who presents to the emergency department for evaluation of syncope and dizziness.  Patient reports that she was standing by a table today at work when she suddenly felt dizzy when she turned around and then felt dizzy and passed out.  Patient reports she has not had 1 of these episodes in a while.  Patient reports she cannot recall any specific signs or symptoms that indicates she is about to get dizzy.  Patient reports  That she is followed by cardiology.  Patient reports she is recently stopped taking her Seroquel and methocarbamol as they are too expensive.  Patient denies any fevers, chills, recent illness, nausea, vomiting, shortness of breath, urinary retention, dysuria, palpitations, chest pain, jaw pain.  Patient reports she does not check regularly check her blood sugar as she does not have a monitor at home.  Patient reports she has been taking all diabetes medications.  Bystanders report that she did not hit her head.  Patient denies any pain at this time.  Patient reports cough    Patient Care Team  Primary Care Provider: Adore Quispe APRN    Past Medical History:     No Known Allergies  Past Medical History:   Diagnosis Date    Acid reflux     Anxiety     Arthritis     Asthma     HAS INHALERS SCHEDULED AND PRN    Depression     Diabetes mellitus     AVERAGE AM 'S    Fibroid     H/O precordial chest pain     FOLLOWED BY DR KAPADIA. DENIES CP/SOA. REPORTS WORKS FULL TIME IN [place].    Hyperlipidemia     Hypertension     Implantable loop recorder present     Injury of back     Migraines     Pain management     [facility]    Sleep apnea     REPORTS IS SUPPOSED TO BE RECEIVING CPAP    Syncope      Past Surgical History:  "  Procedure Laterality Date    CARDIAC CATHETERIZATION N/A 08/12/2021    Procedure: Left Heart Cath - R radial;  Surgeon: NNAMDI Hill MD;  Location: [facility] CATH INVASIVE LOCATION;  Service: Cardiology;  Laterality: N/A;    CARDIAC ELECTROPHYSIOLOGY PROCEDURE N/A 1/11/2024    Procedure: Loop insertion;  Surgeon: NNAMDI Hill MD;  Location: [facility] CATH INVASIVE LOCATION;  Service: Cardiovascular;  Laterality: N/A;    CARDIAC SURGERY      \"repair of hole in heart\" HAD REPAIR OF ATRIAL-SEPTAL DEFECT IN 1980    HYSTERECTOMY      LAPAROSCOPIC TUBAL LIGATION      TOTAL HIP ARTHROPLASTY Right 5/11/2023    Procedure: RIGHT TOTAL HIP ARTHROPLASTY ANTERIOR;  Surgeon: Elias Gruber MD;  Location: [facility] MAIN OR;  Service: Orthopedics;  Laterality: Right;     Family History   Problem Relation Age of Onset    Hypertension Father     Diabetes Father     Cancer Father     Diabetes Mother     Hypertension Mother     Ovarian cancer Neg Hx     Uterine cancer Neg Hx     Breast cancer Neg Hx     Prostate cancer Neg Hx     Colon cancer Neg Hx     Clotting disorder Neg Hx     Deep vein thrombosis Neg Hx     Pulmonary embolism Neg Hx        Home Medications:  Prior to Admission medications    Medication Sig Start Date End Date Taking? Authorizing Provider   albuterol sulfate  (90 Base) MCG/ACT inhaler Inhale 2 puffs Every 4 (Four) Hours As Needed.    Rama Sierra MD   amantadine (SYMMETREL) 100 MG tablet Take 1 tablet by mouth Daily. 2/16/23   Rama Sierra MD   amitriptyline (ELAVIL) 50 MG tablet Take 1 tablet by mouth Every Night.    Rama Sierra MD   ARIPiprazole (ABILIFY) 10 MG tablet Take 1 tablet by mouth Every Night.    Rama Sierra MD   aspirin 325 MG EC tablet Take 1 tablet by mouth Every 12 (Twelve) Hours. INSTRUCTED PER CARDIOLOGY TO CONTINUE TAKING FOR SURGERY 5/12/23   Rama Sierra MD   atorvastatin (LIPITOR) 20 MG tablet Take 1 tablet by mouth every night at bedtime. " periumbilical hernia noted without associated tenderness   Musculoskeletal:         General: No edema. Normal range of motion.     Neurological: He is alert and oriented to person, place, and time.   Skin: Skin is warm and dry.   Psychiatric:   Patient is agitated, hallucinating, tangential, and hyper Zoroastrianism without report of suicidal or homicidal ideation.  Patient is intermittently cooperative and requiring chemical and physical restraints       ED Course   Procedures  Labs Reviewed   CBC W/ AUTO DIFFERENTIAL - Abnormal; Notable for the following components:       Result Value    MCV 79 (*)     MCH 26.7 (*)     RDW 15.3 (*)     All other components within normal limits   COMPREHENSIVE METABOLIC PANEL - Abnormal; Notable for the following components:    Potassium 2.8 (*)     CO2 22 (*)     Creatinine 1.6 (*)     Total Bilirubin 1.3 (*)     eGFR 53.1 (*)     Anion Gap 19 (*)     All other components within normal limits   ACETAMINOPHEN LEVEL - Abnormal; Notable for the following components:    Acetaminophen (Tylenol), Serum <3.0 (*)     All other components within normal limits   MAGNESIUM - Abnormal; Notable for the following components:    Magnesium 1.5 (*)     All other components within normal limits    Narrative:     ADD ON MAGNESIUM AND PHOS PER DR ESCOBAR HUDSON/ORDER# [id number]   AND [id number] @ 19:40   POCT GLUCOSE - Abnormal; Notable for the following components:    POCT Glucose 128 (*)     All other components within normal limits   TSH   ALCOHOL,MEDICAL (ETHANOL)   MAGNESIUM   PHOSPHORUS   PHOSPHORUS    Narrative:     ADD ON MAGNESIUM AND PHOS PER DR ESCOBAR HUDSON/ORDER# [id number]   AND [id number] @ 19:40   URINALYSIS, REFLEX TO URINE CULTURE   DRUG SCREEN PANEL, URINE EMERGENCY   POCT GLUCOSE, HAND-HELD DEVICE          Imaging Results    None          Medications   lactated ringers bolus 1,000 mL (1,000 mLs Intravenous New Bag 7/23/23 1944)   sodium chloride 0.9% flush 10 mL (has no  1/18/23   Rama Sierra MD   Calcium 600+D3 600-20 MG-MCG tablet Take 1 tablet by mouth Daily. 2/13/23   Rama Sierra MD   carBAMazepine (CARBATROL) 100 MG 12 hr capsule TAKE 1 CAPSULE BY MOUTH TWICE DAILY X 2 WEEKS THEN TAKE 1 CAPSULE BY MOUTH EVERY DAY X 1 WEEK THEN STOP. 9/2/23   Rama Sierra MD   carvedilol (COREG) 12.5 MG tablet Take 1 tablet by mouth 2 (Two) Times a Day. 12/7/23   Marce Graham APRN   Cholecalciferol (Vitamin D3) 1.25 MG (07681 UT) capsule Take 1 capsule by mouth 1 (One) Time Per Week. TAKES ON FRIDAY 3/15/23   Rama Sierra MD   diclofenac (VOLTAREN) 50 MG EC tablet Take 1 tablet by mouth 2 (Two) Times a Day. 8/9/23   Luci Guaman PA-C   escitalopram (LEXAPRO) 10 MG tablet Take 1 tablet by mouth Daily.    Rama Sierra MD   gabapentin (NEURONTIN) 600 MG tablet Every 8 (Eight) Hours.    Rama Sierra MD   hydroCHLOROthiazide (HYDRODIURIL) 25 MG tablet Take 1 tablet by mouth Daily. 12/21/23   Marce Graham APRN   hydrOXYzine (ATARAX) 50 MG tablet TAKE 1/2 TO 1 TABLET BY MOUTH EVERY 6 HOURS AS NEEDED FOR ITCHING 8/29/23   Rama Sierra MD   Jardiance 10 MG tablet tablet 1 tablet Daily. INSTRUCTED PER ANESTHESIA PROTOCOL 1/18/23   Rama Sierra MD   loratadine (CLARITIN) 10 MG tablet Take 1 tablet by mouth Daily. 8/29/23   Rama Sierra MD   melatonin 5 MG tablet tablet Take 1 tablet by mouth Every Night.    Rama Sierra MD   metFORMIN (GLUCOPHAGE) 500 MG tablet Take 1 tablet by mouth 2 (Two) Times a Day.    Rama Sierra MD   methocarbamol (ROBAXIN) 750 MG tablet Take 1 tablet by mouth 3 (Three) Times a Day. 3/22/23   Rama Seirra MD   montelukast (SINGULAIR) 10 MG tablet Take 1 tablet by mouth Every Night.    Rama Sierra MD   naloxone (NARCAN) 4 MG/0.1ML nasal spray Call 911. Don't prime. [place] in 1 nostril for overdose. Repeat in 2-3 minutes in other nostril if  administration in time range)   naloxone 0.4 mg/mL injection 0.02 mg (has no administration in time range)   glucose chewable tablet 16 g (has no administration in time range)   glucose chewable tablet 24 g (has no administration in time range)   glucagon (human recombinant) injection 1 mg (has no administration in time range)   acetaminophen tablet 650 mg (has no administration in time range)   prochlorperazine injection Soln 5 mg (has no administration in time range)   insulin aspart U-100 pen 0-5 Units (has no administration in time range)   sodium chloride 0.9% bolus 1,000 mL 1,000 mL (has no administration in time range)   magnesium sulfate 2g in water 50mL IVPB (premix) (has no administration in time range)   OLANZapine injection 5 mg (has no administration in time range)   midazolam (VERSED) 1 mg/mL injection 5 mg (5 mg Intramuscular Given 7/23/23 1801)   midazolam (VERSED) 1 mg/mL injection 5 mg (5 mg Intramuscular Given 7/23/23 1750)   potassium bicarbonate disintegrating tablet 25 mEq (25 mEq Oral Given 7/23/23 1934)     Medical Decision Making:   History:   Old Medical Records: I decided to obtain old medical records.  Old Records Summarized: records from previous admission(s).  Differential Diagnosis:   Acute psychosis, thyroid disorder, acute toxicosis, electrolyte derangement  Clinical Tests:   Lab Tests: Ordered and Reviewed          Attending Attestation:             Attending ED Notes:   Patient cooperative 50 improved with chemical and physical restraints.  Laboratory evaluation reveals evidence of an acute kidney injury without significant azotemia as well as hypokalemia and hypomagnesemia with a known baseline creatinine of 0.8.  Fluid resuscitation has been initiated the ED as well as IV repletion of magnesium.  A physician's emergency certificate has been completed by me for grave disability.  Findings and concerns have been discussed with hospital medicine, and he will be placed in observation  no or minimal breathing/responsiveness. 5/11/23   Elias Gruber MD   omeprazole (priLOSEC) 20 MG capsule Take 1 capsule by mouth Daily. 10/12/23   Rama Sierra MD   ondansetron ODT (ZOFRAN-ODT) 4 MG disintegrating tablet 1 tablet Every 8 (Eight) Hours As Needed for Nausea or Vomiting. 10/12/23   Rama Sierra MD   oxyCODONE-acetaminophen (PERCOCET) 5-325 MG per tablet Take 1 tablet by mouth 3 (Three) Times a Day. 6/2/23   Rama Sierra MD   potassium chloride 10 MEQ CR tablet Take 2 tablets by mouth 2 (Two) Times a Day. 7/30/21   NNAMDI Hill MD   prazosin (MINIPRESS) 1 MG capsule Take 1 capsule by mouth Every Night.    Rama Sierra MD   prazosin (MINIPRESS) 2 MG capsule Take 1 capsule by mouth Every Night. 6/22/21   Emergency, Nurse Epic, RN   QUEtiapine (SEROquel) 100 MG tablet Take 1 tablet by mouth Every Night. 5/9/23   Rama Sierra MD   Symbicort 80-4.5 MCG/ACT inhaler Inhale 2 puffs 2 (Two) Times a Day. 6/22/21   Rama Sierra MD   Vitamin E 180 MG (400 UNIT) capsule capsule Take 1 capsule by mouth Daily. 1/18/23   Rama Sierra MD        Social History:   Social History     Tobacco Use    Smoking status: Never     Passive exposure: Never    Smokeless tobacco: Never   Vaping Use    Vaping status: Never Used   Substance Use Topics    Alcohol use: Never    Drug use: Never         Review of Systems:  Review of Systems   Constitutional:  Negative for chills and fever.   HENT:  Negative for congestion, ear pain and sore throat.    Eyes:  Negative for pain.   Respiratory:  Positive for cough. Negative for chest tightness and shortness of breath.    Cardiovascular:  Negative for chest pain.   Gastrointestinal:  Negative for abdominal pain, diarrhea, nausea and vomiting.   Genitourinary:  Negative for flank pain and hematuria.   Musculoskeletal:  Negative for joint swelling.   Skin:  Negative for pallor.   Neurological:  Positive for syncope. Negative for  under their care in fair condition for further therapy and management.                 Clinical Impression:   Final diagnoses:  [F23] Acute psychosis (Primary)  [N17.9] Acute kidney injury  [E87.6] Hypokalemia  [E83.42] Hypomagnesemia        ED Disposition Condition    Observation [facility]                Juanjo Fulton MD  07/23/23 2037     "seizures and headaches.   All other systems reviewed and are negative.       Physical Exam:  /59 (BP Location: Right arm, Patient Position: Lying)   Pulse 75   Temp 98.2 °F (36.8 °C) (Oral)   Resp 16   Ht 170.2 cm (67\")   Wt 74 kg (163 lb 2.3 oz)   SpO2 100%   BMI 25.55 kg/m²     Physical Exam  Vitals and nursing note reviewed.   Constitutional:       General: She is not in acute distress.     Appearance: Normal appearance. She is obese. She is not ill-appearing or toxic-appearing.   HENT:      Head: Normocephalic and atraumatic.   Eyes:      Extraocular Movements: Extraocular movements intact.   Cardiovascular:      Rate and Rhythm: Normal rate and regular rhythm.      Pulses: Normal pulses.      Heart sounds: Normal heart sounds. No murmur heard.     No friction rub.   Pulmonary:      Effort: Pulmonary effort is normal.      Breath sounds: Normal breath sounds. No wheezing, rhonchi or rales.   Abdominal:      General: Abdomen is flat.      Palpations: Abdomen is soft.   Skin:     General: Skin is warm.   Neurological:      General: No focal deficit present.      Mental Status: She is alert and oriented to person, place, and time. Mental status is at baseline.   Psychiatric:         Mood and Affect: Mood normal.         Behavior: Behavior normal.         Thought Content: Thought content normal.         Judgment: Judgment normal.                  Procedures:  Procedures      Medical Decision Making:      Comorbidities that affect care:    Diabetes, Hypertension, Obesity    External Notes reviewed:    Previous ED Note: Last visit in May 31, 2023 for lightheadedness.  EKG was normal, sats were normal.  Blood pressures were normal., Previous EKG: Reviewed with no acute findings., and Previous Labs: Reviewed without acute findings.      The following orders were placed and all results were independently analyzed by me:  Orders Placed This Encounter   Procedures    Respiratory Culture - Sputum, Throat    " Legionella Antigen, Urine - Urine, Urine, Clean Catch    S. Pneumo Ag Urine or CSF - Urine, Urine, Clean Catch    COVID PRE-OP / PRE-PROCEDURE SCREENING ORDER (NO ISOLATION) - Swab, Nasopharynx    COVID-19,CEPHEID/DARLIN,COR/MANASA/PAD/KAVIN/LAG/PENNIE IN-HOUSE,NP SWAB IN TRANSPORT MEDIA 1 HR TAT, RT-PCR - Swab, Nasopharynx    Blood Culture - Blood,    Blood Culture - Blood,    XR Chest 1 View    [place] Draw    Comprehensive Metabolic Panel    Magnesium    Single High Sensitivity Troponin T    CBC Auto Differential    Scan Slide    Urinalysis With Microscopic If Indicated (No Culture) - Urine, Clean Catch    Lactic Acid, Plasma    Urinalysis, Microscopic Only - Urine, Clean Catch    Procalcitonin    Diet: Regular/House; Fluid Consistency: Thin (IDDSI 0)    Undress & Gown    Continuous Pulse Oximetry    Vital Signs    Orthostatic Blood Pressure    Orthostatic Blood Pressure    Vital Signs    Intake & Output    Weigh Patient    Oral Care    Saline Lock & Maintain IV Access    Place Sequential Compression Device    Maintain Sequential Compression Device    Inpatient Hospitalist Consult    Oxygen Therapy- Nasal Cannula; Titrate 1-6 LPM Per SpO2; 90 - 95%    POC Glucose Once    ECG 12 Lead ED Triage Standing Order; Syncope    Insert Peripheral IV    Insert Peripheral IV    Inpatient Admission    CBC & Differential    Green Top (Gel)    Lavender Top    Gold Top - SST    Light Blue Top       Medications Given in the Emergency Department:  Medications   sodium chloride 0.9 % flush 10 mL (has no administration in time range)   sodium chloride 0.9 % flush 10 mL (has no administration in time range)   sodium chloride 0.9 % flush 10 mL (has no administration in time range)   sodium chloride 0.9 % infusion 40 mL (has no administration in time range)   sennosides-docusate (PERICOLACE) 8.6-50 MG per tablet 2 tablet (has no administration in time range)     And   polyethylene glycol (MIRALAX) packet 17 g (has no administration in time  range)     And   bisacodyl (DULCOLAX) EC tablet 5 mg (has no administration in time range)     And   bisacodyl (DULCOLAX) suppository 10 mg (has no administration in time range)   AZITHROMYCIN 500 MG/250 ML 0.9% NS IVPB (vial-mate) (has no administration in time range)        ED Course:    ED Course as of 09/13/24 1358   Fri Sep 13, 2024   1126 Urinalysis With Microscopic If Indicated (No Culture) - Urine, Clean Catch [CB]   1147 EKG:    Rhythm: Regular  Rate: Regular  Intervals: Within normal limits  T-wave: Precordial  ST Segment: Within normal limits    EKG Comparison: EKG shows no signs of ST depressions or elevations.  Evidence of diffuse T wave inversion consistent with prior EKGs    Interpreted by me  [CB]   1212 Mucus, UA(!): Moderate/2+ [CB]   1336 XR Chest 1 View [CB]      ED Course User Index  [CB] Andrzej Brown, SHERIF       Labs:    Lab Results (last 24 hours)       Procedure Component Value Units Date/Time    CBC & Differential [[id number]]  (Abnormal) Collected: 09/13/24 1049    Specimen: Blood Updated: 09/13/24 1121    Narrative:      The following orders were created for panel order CBC & Differential.  Procedure                               Abnormality         Status                     ---------                               -----------         ------                     CBC Auto Differential[[id number]]        Abnormal            Final result               Scan Slide[[id number]]                                       Final result                 Please view results for these tests on the individual orders.    Comprehensive Metabolic Panel [[id number]]  (Abnormal) Collected: 09/13/24 1049    Specimen: Blood Updated: 09/13/24 1112     Glucose 131 mg/dL      BUN 12 mg/dL      Creatinine 0.81 mg/dL      Sodium 139 mmol/L      Potassium 3.2 mmol/L      Comment: Slight hemolysis detected by analyzer. Result may be falsely elevated.        Chloride 103 mmol/L      CO2 27.1 mmol/L      Calcium 9.1 mg/dL       Total Protein 6.6 g/dL      Albumin 3.6 g/dL      ALT (SGPT) 12 U/L      AST (SGOT) 16 U/L      Alkaline Phosphatase 94 U/L      Total Bilirubin 0.3 mg/dL      Globulin 3.0 gm/dL      A/G Ratio 1.2 g/dL      BUN/Creatinine Ratio 14.8     Anion Gap 8.9 mmol/L      eGFR 88.6 mL/min/1.73     Narrative:      GFR Normal >60  Chronic Kidney Disease <60  Kidney Failure <15      Magnesium [[id number]]  (Normal) Collected: 09/13/24 1049    Specimen: Blood Updated: 09/13/24 1112     Magnesium 1.7 mg/dL     Single High Sensitivity Troponin T [[id number]]  (Normal) Collected: 09/13/24 1049    Specimen: Blood Updated: 09/13/24 1112     HS Troponin T 7 ng/L     Narrative:      High Sensitive Troponin T Reference Range:  <14.0 ng/L- Negative Female for AMI  <22.0 ng/L- Negative Male for AMI  >=14 - Abnormal Female indicating possible myocardial injury.  >=22 - Abnormal Male indicating possible myocardial injury.   Clinicians would have to utilize clinical acumen, EKG, Troponin, and serial changes to determine if it is an Acute Myocardial Infarction or myocardial injury due to an underlying chronic condition.         CBC Auto Differential [[id number]]  (Abnormal) Collected: 09/13/24 1049    Specimen: Blood Updated: 09/13/24 1121     WBC 5.19 10*3/mm3      RBC 3.55 10*6/mm3      Hemoglobin 10.4 g/dL      Hematocrit 32.6 %      MCV 91.8 fL      MCH 29.3 pg      MCHC 31.9 g/dL      RDW 15.7 %      RDW-SD 51.7 fl      MPV 10.9 fL      Platelets 268 10*3/mm3      Neutrophil % 59.9 %      Lymphocyte % 30.6 %      Monocyte % 8.1 %      Eosinophil % 0.8 %      Basophil % 0.4 %      Immature Grans % 0.2 %      Neutrophils, Absolute 3.11 10*3/mm3      Lymphocytes, Absolute 1.59 10*3/mm3      Monocytes, Absolute 0.42 10*3/mm3      Eosinophils, Absolute 0.04 10*3/mm3      Basophils, Absolute 0.02 10*3/mm3      Immature Grans, Absolute 0.01 10*3/mm3      nRBC 0.0 /100 WBC     Scan Slide [[id number]] Collected: 09/13/24 1049    Specimen: Blood  Updated: 09/13/24 1121     Anisocytosis Slight/1+     WBC Morphology Normal     Platelet Estimate Adequate     Clumped Platelets Present    Procalcitonin [[id number]] Collected: 09/13/24 1049    Specimen: Blood Updated: 09/13/24 1348    Urinalysis With Microscopic If Indicated (No Culture) - Urine, Clean Catch [[id number]]  (Abnormal) Collected: 09/13/24 1140    Specimen: Urine, Clean Catch Updated: 09/13/24 1203     Color, UA Dark Yellow     Appearance, UA Cloudy     pH, UA 5.5     Specific Gravity, UA >1.030     Glucose, UA Negative     Ketones, UA Negative     Bilirubin, UA Negative     Blood, UA Negative     Protein, UA 30 mg/dL (1+)     Leuk Esterase, UA Trace     Nitrite, UA Negative     Urobilinogen, UA 1.0 E.U./dL    Lactic Acid, Plasma [[id number]]  (Normal) Collected: 09/13/24 1140    Specimen: Blood Updated: 09/13/24 1219     Lactate 1.7 mmol/L     Urinalysis, Microscopic Only - Urine, Clean Catch [[id number]]  (Abnormal) Collected: 09/13/24 1140    Specimen: Urine, Clean Catch Updated: 09/13/24 1203     RBC, UA None Seen /HPF      WBC, UA 6-10 /HPF      Bacteria, UA 2+ /HPF      Squamous Epithelial Cells, UA 7-12 /HPF      Hyaline Casts, UA 0-2 /LPF      Mucus, UA Moderate/2+ /HPF      Methodology Manual Light Microscopy             Imaging:    XR Chest 1 View    Result Date: 9/13/2024  XR CHEST 1 VW Date of Exam: 9/13/2024 12:58 PM EDT Indication: Cough, persistent Cough cough Comparison: 5/31/2023 and prior Findings: Study limited by overlying support and monitoring apparatus. Lung volumes are low. Patient status post median sternotomy. The heart size is within normal limits for technique. Pulmonary vascularity appears grossly unremarkable in appearance. Dependent opacities and vascular crowding at the lung bases noted favored represent atelectasis. Findings slightly more prominent on the left than the right. Implantable rate monitoring device overlies the lower chest. There is no acute osseous abnormality  appreciated.     Impression: 1. Dependent opacities at the lung bases favored represent atelectasis. Pneumonia not excluded. Electronically Signed: Bony Espinoza MD  9/13/2024 1:20 PM EDT  Workstation ID: [id number]       Differential Diagnosis and Discussion:    Dizziness: Based on the patient's history, signs, and symptoms, the diffential diagnosis includes but is not limited to meningitis, stroke, sepsis, subarachnoid hemorrhage, intracranial bleeding, encephalitis, vertigo, electrolyte imbalance, and metabolic disorders.  Syncope: Differential diagnosis includes but is not limited to TIA, hyperventilation, aortic stenosis, pulmonary emboli, myocardial disease, bradycardia arrhythmia, heart block, tachyarrhythmia, vasovagal, orthostatic hypotension, ruptured AAA, aortic dissection, subarachnoid hemorrhage, seizure, hypoglycemia.    All labs were reviewed and interpreted by me.  All X-rays impressions were independently interpreted by me.  EKG was interpreted by me.    MDM     Amount and/or Complexity of Data Reviewed  Decide to obtain previous medical records or to obtain history from someone other than the patient: yes       The patient´s CBC that was reviewed and interpreted by me shows no abnormalities of critical concern. Of note, there is no anemia requiring a blood transfusion and the platelet count is acceptable.  The patient´s CMP that was reviewed and interpretted by me shows no abnormalities of critical concern. Of note, the patient´s sodium and potassium are acceptable. The patient´s liver enzymes are unremarkable. The patient´s renal function (creatinine) is preserved. The patient has a normal anion gap.  Checks x-ray reveals infiltrates.  Patient was given Rocephin and oral Zithromax.        Sepsis was not present during time in the emergency department.    Patient Care Considerations:    PERC: I used the PERC score to risk stratify the patient for PE and a CT of the chest was considered but  ultimately not indicated in today's visit.      Consultants/Shared Management Plan:    Case was discussed with Dr. Montiel who agrees with admission.    Social Determinants of Health:    Patient is independent, reliable, and has access to care.       Disposition and Care Coordination:    Admit:   Through independent evaluation of the patient's history, physical, and imperical data, the patient meets criteria for inpatient admission to the hospital.        Final diagnoses:   Syncope and collapse        ED Disposition       ED Disposition   Decision to Admit    Condition   --    Comment   Level of Care: Progressive Care [20]   Diagnosis: Syncope [841054]   Admitting Physician: ENEIDA MONTIEL [[id number]]   Attending Physician: ENEIDA MONTIEL [[id number]]   Certification: I Certify That Inpatient Hospital Services Are Medically Necessary For Greater Than 2 Midnights                 This medical record created using voice recognition software.             Andrzej Brown PA-C  09/13/24 [id number]

## 2024-10-06 ENCOUNTER — HOSPITAL ENCOUNTER (EMERGENCY)
Facility: HOSPITAL | Age: 49
Discharge: PSYCHIATRIC OR SUBSTANCE USE FACILITY | End: 2024-10-07
Attending: STUDENT IN AN ORGANIZED HEALTH CARE EDUCATION/TRAINING PROGRAM
Payer: MEDICARE

## 2024-10-06 DIAGNOSIS — E87.6 HYPOKALEMIA: ICD-10-CM

## 2024-10-06 DIAGNOSIS — Z00.8 MEDICAL CLEARANCE FOR PSYCHIATRIC ADMISSION: ICD-10-CM

## 2024-10-06 DIAGNOSIS — F23 ACUTE PSYCHOSIS: Primary | ICD-10-CM

## 2024-10-06 LAB
ALBUMIN SERPL BCP-MCNC: 3.6 G/DL (ref 3.5–5.2)
ALP SERPL-CCNC: 75 U/L (ref 55–135)
ALT SERPL W/O P-5'-P-CCNC: 30 U/L (ref 10–44)
AMPHET+METHAMPHET UR QL: NEGATIVE
ANION GAP SERPL CALC-SCNC: 11 MMOL/L (ref 8–16)
APAP SERPL-MCNC: <3 UG/ML (ref 10–20)
AST SERPL-CCNC: 24 U/L (ref 10–40)
BARBITURATES UR QL SCN>200 NG/ML: NEGATIVE
BASOPHILS # BLD AUTO: 0.04 K/UL (ref 0–0.2)
BASOPHILS NFR BLD: 0.8 % (ref 0–1.9)
BENZODIAZ UR QL SCN>200 NG/ML: NEGATIVE
BILIRUB SERPL-MCNC: 1.2 MG/DL (ref 0.1–1)
BILIRUB UR QL STRIP: NEGATIVE
BUN SERPL-MCNC: 10 MG/DL (ref 6–20)
BZE UR QL SCN: NEGATIVE
CALCIUM SERPL-MCNC: 9.6 MG/DL (ref 8.7–10.5)
CANNABINOIDS UR QL SCN: NEGATIVE
CHLORIDE SERPL-SCNC: 96 MMOL/L (ref 95–110)
CLARITY UR REFRACT.AUTO: CLEAR
CO2 SERPL-SCNC: 31 MMOL/L (ref 23–29)
COLOR UR AUTO: YELLOW
CREAT SERPL-MCNC: 0.9 MG/DL (ref 0.5–1.4)
CREAT UR-MCNC: 41 MG/DL (ref 23–375)
DIFFERENTIAL METHOD BLD: ABNORMAL
EOSINOPHIL # BLD AUTO: 0.1 K/UL (ref 0–0.5)
EOSINOPHIL NFR BLD: 1.5 % (ref 0–8)
ERYTHROCYTE [DISTWIDTH] IN BLOOD BY AUTOMATED COUNT: 14.4 % (ref 11.5–14.5)
EST. GFR  (NO RACE VARIABLE): >60 ML/MIN/1.73 M^2
ETHANOL SERPL-MCNC: <10 MG/DL
GLUCOSE SERPL-MCNC: 92 MG/DL (ref 70–110)
GLUCOSE UR QL STRIP: NEGATIVE
HCT VFR BLD AUTO: 47.8 % (ref 40–54)
HCV AB SERPL QL IA: NORMAL
HGB BLD-MCNC: 15.3 G/DL (ref 14–18)
HGB UR QL STRIP: NEGATIVE
HIV 1+2 AB+HIV1 P24 AG SERPL QL IA: NORMAL
IMM GRANULOCYTES # BLD AUTO: 0.01 K/UL (ref 0–0.04)
IMM GRANULOCYTES NFR BLD AUTO: 0.2 % (ref 0–0.5)
KETONES UR QL STRIP: NEGATIVE
LEUKOCYTE ESTERASE UR QL STRIP: NEGATIVE
LYMPHOCYTES # BLD AUTO: 1.3 K/UL (ref 1–4.8)
LYMPHOCYTES NFR BLD: 27 % (ref 18–48)
MCH RBC QN AUTO: 26.2 PG (ref 27–31)
MCHC RBC AUTO-ENTMCNC: 32 G/DL (ref 32–36)
MCV RBC AUTO: 82 FL (ref 82–98)
METHADONE UR QL SCN>300 NG/ML: NEGATIVE
MONOCYTES # BLD AUTO: 0.9 K/UL (ref 0.3–1)
MONOCYTES NFR BLD: 19.1 % (ref 4–15)
NEUTROPHILS # BLD AUTO: 2.4 K/UL (ref 1.8–7.7)
NEUTROPHILS NFR BLD: 51.4 % (ref 38–73)
NITRITE UR QL STRIP: NEGATIVE
NRBC BLD-RTO: 0 /100 WBC
OPIATES UR QL SCN: NEGATIVE
PCP UR QL SCN>25 NG/ML: NEGATIVE
PH UR STRIP: 8 [PH] (ref 5–8)
PLATELET # BLD AUTO: 263 K/UL (ref 150–450)
PMV BLD AUTO: 10 FL (ref 9.2–12.9)
POTASSIUM SERPL-SCNC: 2.8 MMOL/L (ref 3.5–5.1)
PROT SERPL-MCNC: 7.1 G/DL (ref 6–8.4)
PROT UR QL STRIP: NEGATIVE
RBC # BLD AUTO: 5.83 M/UL (ref 4.6–6.2)
SALICYLATES SERPL-MCNC: <5 MG/DL (ref 15–30)
SODIUM SERPL-SCNC: 138 MMOL/L (ref 136–145)
SP GR UR STRIP: 1 (ref 1–1.03)
TOXICOLOGY INFORMATION: NORMAL
TSH SERPL DL<=0.005 MIU/L-ACNC: 2.04 UIU/ML (ref 0.4–4)
URN SPEC COLLECT METH UR: NORMAL
WBC # BLD AUTO: 4.71 K/UL (ref 3.9–12.7)

## 2024-10-06 PROCEDURE — 85025 COMPLETE CBC W/AUTO DIFF WBC: CPT | Performed by: STUDENT IN AN ORGANIZED HEALTH CARE EDUCATION/TRAINING PROGRAM

## 2024-10-06 PROCEDURE — 86803 HEPATITIS C AB TEST: CPT | Performed by: PHYSICIAN ASSISTANT

## 2024-10-06 PROCEDURE — 99285 EMERGENCY DEPT VISIT HI MDM: CPT | Mod: 25

## 2024-10-06 PROCEDURE — 80143 DRUG ASSAY ACETAMINOPHEN: CPT | Performed by: STUDENT IN AN ORGANIZED HEALTH CARE EDUCATION/TRAINING PROGRAM

## 2024-10-06 PROCEDURE — 25000003 PHARM REV CODE 250: Performed by: STUDENT IN AN ORGANIZED HEALTH CARE EDUCATION/TRAINING PROGRAM

## 2024-10-06 PROCEDURE — 80179 DRUG ASSAY SALICYLATE: CPT | Performed by: STUDENT IN AN ORGANIZED HEALTH CARE EDUCATION/TRAINING PROGRAM

## 2024-10-06 PROCEDURE — 93010 ELECTROCARDIOGRAM REPORT: CPT | Mod: ,,, | Performed by: INTERNAL MEDICINE

## 2024-10-06 PROCEDURE — 80053 COMPREHEN METABOLIC PANEL: CPT | Performed by: STUDENT IN AN ORGANIZED HEALTH CARE EDUCATION/TRAINING PROGRAM

## 2024-10-06 PROCEDURE — 82077 ASSAY SPEC XCP UR&BREATH IA: CPT | Performed by: STUDENT IN AN ORGANIZED HEALTH CARE EDUCATION/TRAINING PROGRAM

## 2024-10-06 PROCEDURE — 81003 URINALYSIS AUTO W/O SCOPE: CPT | Mod: 59 | Performed by: STUDENT IN AN ORGANIZED HEALTH CARE EDUCATION/TRAINING PROGRAM

## 2024-10-06 PROCEDURE — 80307 DRUG TEST PRSMV CHEM ANLYZR: CPT | Performed by: STUDENT IN AN ORGANIZED HEALTH CARE EDUCATION/TRAINING PROGRAM

## 2024-10-06 PROCEDURE — 84443 ASSAY THYROID STIM HORMONE: CPT | Performed by: STUDENT IN AN ORGANIZED HEALTH CARE EDUCATION/TRAINING PROGRAM

## 2024-10-06 PROCEDURE — 93005 ELECTROCARDIOGRAM TRACING: CPT

## 2024-10-06 PROCEDURE — 87389 HIV-1 AG W/HIV-1&-2 AB AG IA: CPT | Performed by: PHYSICIAN ASSISTANT

## 2024-10-06 RX ORDER — HALOPERIDOL 5 MG/ML
5 INJECTION INTRAMUSCULAR
Status: DISCONTINUED | OUTPATIENT
Start: 2024-10-06 | End: 2024-10-06

## 2024-10-06 RX ADMIN — POTASSIUM BICARBONATE 60 MEQ: 391 TABLET, EFFERVESCENT ORAL at 10:10

## 2024-10-07 VITALS
WEIGHT: 223 LBS | DIASTOLIC BLOOD PRESSURE: 67 MMHG | SYSTOLIC BLOOD PRESSURE: 117 MMHG | TEMPERATURE: 98 F | BODY MASS INDEX: 30.2 KG/M2 | OXYGEN SATURATION: 96 % | HEIGHT: 72 IN | HEART RATE: 86 BPM | RESPIRATION RATE: 18 BRPM

## 2024-10-07 LAB
OHS QRS DURATION: 88 MS
OHS QTC CALCULATION: 408 MS

## 2024-10-07 PROCEDURE — 25000003 PHARM REV CODE 250: Performed by: STUDENT IN AN ORGANIZED HEALTH CARE EDUCATION/TRAINING PROGRAM

## 2024-10-07 RX ADMIN — POTASSIUM BICARBONATE 60 MEQ: 391 TABLET, EFFERVESCENT ORAL at 01:10

## 2024-10-07 NOTE — ED NOTES
Pt escorted off of the unit on a stretcher by ED and security staff. Pt's PEC, transfer form, and all belongings given to [facility] staff. Pt remained calm and cooperative for the transfer.

## 2024-10-07 NOTE — ED PROVIDER NOTES
Encounter Date: 10/6/2024       History     Chief Complaint   Patient presents with    Psychiatric Evaluation     Arrives with OPC. Hx of schizophrenia; noncompliant with medication. Bizarre behavior and talking to demons in the house.     HPI  Patient is a 49-year-old male with history of schizophrenia, hypertension, type 2 diabetes brought in by EMS for abnormal behavior.  He was brought in on an OPC from home.  Patient was reportedly not been taking his medications.  He has been hitting walls at home, slamming doors and pacing around the house.  He has not been sleeping.  He has been having paranoid delusions and believes that they are demons in his home and has been screaming at nothing.  Patient appears to be responding to internal stimuli.  He was tangential and difficult to obtain a history from.  He denies any complaints of pain in his overall well-appearing.    Review of patient's allergies indicates:   Allergen Reactions    No known allergies      Past Medical History:   Diagnosis Date    Hypertension     Schizophrenia     Type 2 diabetes mellitus 5/1/2021     Past Surgical History:   Procedure Laterality Date    ANKLE SURGERY       Family History   Problem Relation Name Age of Onset    Prostate cancer Neg Hx       Social History     Tobacco Use    Smoking status: Never    Smokeless tobacco: Never   Substance Use Topics    Alcohol use: No    Drug use: No     Review of Systems  A full review of systems was obtained, see HPI for pertinent positives    Physical Exam     Initial Vitals [10/06/24 1847]   BP Pulse Resp Temp SpO2   (!) 166/92 (!) 122 (!) 22 98.1 °F (36.7 °C) 99 %      MAP       --         Physical Exam  Constitutional: No acute distress, well-appearing, nontoxic  HENT: Normocephalic, atraumatic  Neck: Supple, normal range of motion  Respiratory: Non-labored, lungs clear  Cardiovascular: Well perfused, normal rate, regular rhythm  Gastrointestinal: Soft, non-tender, non-distended  Integumentary:  Warm and dry  Musculoskeletal: No deformity  Neurological: Awake and alert, normal motor and sensation throughout  Psychiatric:  Cooperative, flat affect, exhibiting paranoid delusions, tangential thought process    ED Course   Procedures  Labs Reviewed   HIV 1 / 2 ANTIBODY   HEPATITIS C ANTIBODY   CBC W/ AUTO DIFFERENTIAL   COMPREHENSIVE METABOLIC PANEL   TSH   URINALYSIS, REFLEX TO URINE CULTURE   DRUG SCREEN PANEL, URINE EMERGENCY   ALCOHOL,MEDICAL (ETHANOL)   ACETAMINOPHEN LEVEL   SALICYLATE LEVEL          Imaging Results    None          Medications   sodium chloride 0.9% bolus 1,000 mL 1,000 mL (has no administration in time range)   haloperidol lactate injection 5 mg (has no administration in time range)     Medical Decision Making  Patient is a 49-year-old male with history of schizophrenia and medication noncompliance who initially presented on an OPC from home for abnormal behavior.  He has reportedly been responding to internal stimuli and has been having paranoid delusions.  He was not been sleeping and has been pacing around his house and yelling and slamming doors.  Family was concerned for his erratic behavior which prompted his emergency department visit today.  On exam, patient was overall very well-appearing.  He has no signs of trauma.  He is tachycardic but otherwise has reassuring vitals.  He is difficult to obtain history from it appears to be responding to internal stimuli.  He has a tangential thought process.  Will obtain labs for medical clearance.  Plan to follow up lab work for medical clearance for inpatient psychiatric treatment.  Pec filed.    Patient is still pending all his lab work at time of changeover to oncoming staff. Plan for medical clearance for inpatient psychiatric treatment/placement if lab work reassuring    Amount and/or Complexity of Data Reviewed  Labs: ordered.    Risk  Prescription drug management.               ED Course as of 10/06/24 2049   Sun Oct 06, 2024   2048  EKG with normal sinus rhythm, rate 81, no STEMI [NN]      ED Course User Index  [NN] Flora Suero MD                           Clinical Impression:  Final diagnoses:  [F23] Acute psychosis                 Flora Suero MD  10/06/24 2047       Flora Suero MD  10/06/24 2050

## 2024-10-07 NOTE — PROVIDER PROGRESS NOTES - EMERGENCY DEPT.
ED Attending Hand-off Note    I have discussed the patient's history and presentation in the ED with Flora Suero MD    Brief H&P: Patient is a 49 y.o. male who presented to the ED with Psychiatric Evaluation (Arrives with OPC. Hx of schizophrenia; noncompliant with medication. Bizarre behavior and talking to demons in the house.)        Pending studies and consultations: labs/medical clearance, then transfer to psych    Disposition:  Will continue to monitor, update patient on results of testing and determine appropriate additional treatment for the duration of stay in ED.  Bear Morales MD 9:12 PM 10/6/2024        UPDATES:   ED Course as of 10/06/24 2247   Sun Oct 06, 2024   2048 EKG with normal sinus rhythm, rate 81, no STEMI [NN]   2147 CBC auto differential(!)  CBC unremarkable without leukocytosis, significant anemia, or decreased platelets   [BD]   2148 Potassium(!): 2.8  Will replenish PO [BD]   2148 Acetaminophen Level(!): <3.0 [BD]   2148 Salicylate Level(!): <5.0 [BD]   2148 Alcohol, Serum: <10 [BD]   2213 TSH: 2.045 [BD]   2231 Patient has been medically cleared at this time he will be transferred to psychiatric facility upon placement. [BD]      ED Course User Index  [BD] Bear Morales MD  [NN] Flora Suero MD             Clinical Impression  :  Acute psychosis (Primary)  Hypokalemia  Medical clearance for psychiatric admission       ED Disposition Condition    Transfer to Psych Facility Stable

## 2024-10-07 NOTE — ED NOTES
Assumed pt care, he is lying on the stretcher resting NAD noted. Pt dressed in paper scrubs all belongings secured otuside of the room. Pt room scanned for hazards, EDT outside of the room DVC maintained. Pt has placement to [facility] pending transportation. Pt has remained calm and cooperative throughout the evening.

## 2024-10-07 NOTE — ED TRIAGE NOTES
Gómez Ahumada, a 49 y.o. male presents to the ED w/ complaint of bizarre behavior and talking to demons in his home. Pt is hearing voices he calls demons.    Triage note:  Chief Complaint   Patient presents with    Psychiatric Evaluation     Arrives with OPC. Hx of schizophrenia; noncompliant with medication. Bizarre behavior and talking to demons in the house.     Review of patient's allergies indicates:   Allergen Reactions    No known allergies      Past Medical History:   Diagnosis Date    Hypertension     Schizophrenia     Type 2 diabetes mellitus 5/1/2021     Patient identifiers verified and correct for   LOC: The patient is awake, alert and aware of environment with an appropriate affect, the patient is oriented x 3 and speaking appropriately. At his baseline. His hard to understand conversation because he talks fast.  APPEARANCE: Patient appears comfortable and in no acute distress, patient is clean and well groomed.  SKIN: The skin is warm and dry, color consistent with ethnicity, patient has normal skin turgor and moist mucus membranes, skin intact, no breakdown or bruising noted.   MUSCULOSKELETAL: Patient moving all extremities spontaneously, no swelling noted.  RESPIRATORY: Airway is open and patent, respirations are spontaneous, patient has a normal effort and rate, no accessory muscle use noted.  CARDIAC: VSS.  GASTRO: Soft and non tender to palpation, no distention noted, normoactive bowel sounds present in all four quadrants. Pt states bowel movements have been regular.  : Pt denies any pain or frequency with urination.  NEURO: Pt opens eyes spontaneously, behavior appropriate to situation, follows commands, facial expression symmetrical.  PSYCH: Pt calm and cooperative. Witnessed talking to self in room. Pt states that he was talking to demons in the house, these are the voices that he hears per pt.

## 2024-10-07 NOTE — ED NOTES
Pt remains in paper scrubs, resting in stretcher comfortably - with side rails up, locked, and in lowest position. Chest rise and fall noted; breathing equal, even, and unlabored. Allyson Prado ED tech remains at bedside in direct visual contact, charting per protocol every 15 minutes. No equipment or belongings are in the patients room to prevent self harm or injury. Pt aware of plan of care. No acute distress noted and no needs expressed at this time. Will continue to assess periodically.

## 2024-12-14 ENCOUNTER — HOSPITAL ENCOUNTER (EMERGENCY)
Facility: HOSPITAL | Age: 49
Discharge: PSYCHIATRIC OR SUBSTANCE USE FACILITY | End: 2024-12-14
Attending: EMERGENCY MEDICINE
Payer: MEDICARE

## 2024-12-14 VITALS
BODY MASS INDEX: 30.22 KG/M2 | HEART RATE: 65 BPM | SYSTOLIC BLOOD PRESSURE: 115 MMHG | TEMPERATURE: 98 F | DIASTOLIC BLOOD PRESSURE: 63 MMHG | OXYGEN SATURATION: 96 % | WEIGHT: 223.13 LBS | RESPIRATION RATE: 18 BRPM | HEIGHT: 72 IN

## 2024-12-14 DIAGNOSIS — F29 PSYCHOSIS, UNSPECIFIED PSYCHOSIS TYPE: Primary | ICD-10-CM

## 2024-12-14 LAB
ALBUMIN SERPL BCP-MCNC: 3.9 G/DL (ref 3.5–5.2)
ALP SERPL-CCNC: 93 U/L (ref 40–150)
ALT SERPL W/O P-5'-P-CCNC: 15 U/L (ref 10–44)
AMPHET+METHAMPHET UR QL: NEGATIVE
ANION GAP SERPL CALC-SCNC: 13 MMOL/L (ref 8–16)
APAP SERPL-MCNC: <3 UG/ML (ref 10–20)
AST SERPL-CCNC: 15 U/L (ref 10–40)
BARBITURATES UR QL SCN>200 NG/ML: NEGATIVE
BASOPHILS # BLD AUTO: 0.04 K/UL (ref 0–0.2)
BASOPHILS NFR BLD: 0.6 % (ref 0–1.9)
BENZODIAZ UR QL SCN>200 NG/ML: NEGATIVE
BILIRUB SERPL-MCNC: 1.1 MG/DL (ref 0.1–1)
BILIRUB UR QL STRIP: NEGATIVE
BUN SERPL-MCNC: 9 MG/DL (ref 6–20)
BZE UR QL SCN: NEGATIVE
CALCIUM SERPL-MCNC: 9.6 MG/DL (ref 8.7–10.5)
CANNABINOIDS UR QL SCN: NEGATIVE
CHLORIDE SERPL-SCNC: 98 MMOL/L (ref 95–110)
CLARITY UR REFRACT.AUTO: CLEAR
CO2 SERPL-SCNC: 24 MMOL/L (ref 23–29)
COLOR UR AUTO: YELLOW
CREAT SERPL-MCNC: 0.9 MG/DL (ref 0.5–1.4)
CREAT UR-MCNC: 135 MG/DL (ref 23–375)
DIFFERENTIAL METHOD BLD: ABNORMAL
EOSINOPHIL # BLD AUTO: 0.1 K/UL (ref 0–0.5)
EOSINOPHIL NFR BLD: 1.7 % (ref 0–8)
ERYTHROCYTE [DISTWIDTH] IN BLOOD BY AUTOMATED COUNT: 14.8 % (ref 11.5–14.5)
EST. GFR  (NO RACE VARIABLE): >60 ML/MIN/1.73 M^2
ETHANOL SERPL-MCNC: <10 MG/DL
GLUCOSE SERPL-MCNC: 94 MG/DL (ref 70–110)
GLUCOSE UR QL STRIP: NEGATIVE
HCT VFR BLD AUTO: 47.4 % (ref 40–54)
HGB BLD-MCNC: 15.2 G/DL (ref 14–18)
HGB UR QL STRIP: NEGATIVE
IMM GRANULOCYTES # BLD AUTO: 0.02 K/UL (ref 0–0.04)
IMM GRANULOCYTES NFR BLD AUTO: 0.3 % (ref 0–0.5)
KETONES UR QL STRIP: NEGATIVE
LEUKOCYTE ESTERASE UR QL STRIP: NEGATIVE
LYMPHOCYTES # BLD AUTO: 1.7 K/UL (ref 1–4.8)
LYMPHOCYTES NFR BLD: 25.2 % (ref 18–48)
MCH RBC QN AUTO: 26.7 PG (ref 27–31)
MCHC RBC AUTO-ENTMCNC: 32.1 G/DL (ref 32–36)
MCV RBC AUTO: 83 FL (ref 82–98)
METHADONE UR QL SCN>300 NG/ML: NEGATIVE
MONOCYTES # BLD AUTO: 0.9 K/UL (ref 0.3–1)
MONOCYTES NFR BLD: 13.6 % (ref 4–15)
NEUTROPHILS # BLD AUTO: 4 K/UL (ref 1.8–7.7)
NEUTROPHILS NFR BLD: 58.6 % (ref 38–73)
NITRITE UR QL STRIP: NEGATIVE
NRBC BLD-RTO: 0 /100 WBC
OPIATES UR QL SCN: NEGATIVE
PCP UR QL SCN>25 NG/ML: NEGATIVE
PH UR STRIP: 6 [PH] (ref 5–8)
PLATELET # BLD AUTO: 305 K/UL (ref 150–450)
PMV BLD AUTO: 10 FL (ref 9.2–12.9)
POTASSIUM SERPL-SCNC: 3.3 MMOL/L (ref 3.5–5.1)
PROT SERPL-MCNC: 7.7 G/DL (ref 6–8.4)
PROT UR QL STRIP: NEGATIVE
RBC # BLD AUTO: 5.69 M/UL (ref 4.6–6.2)
SALICYLATES SERPL-MCNC: <5 MG/DL (ref 15–30)
SODIUM SERPL-SCNC: 135 MMOL/L (ref 136–145)
SP GR UR STRIP: 1.01 (ref 1–1.03)
TOXICOLOGY INFORMATION: NORMAL
TSH SERPL DL<=0.005 MIU/L-ACNC: 1.94 UIU/ML (ref 0.4–4)
URN SPEC COLLECT METH UR: NORMAL
WBC # BLD AUTO: 6.86 K/UL (ref 3.9–12.7)

## 2024-12-14 PROCEDURE — 80143 DRUG ASSAY ACETAMINOPHEN: CPT | Performed by: EMERGENCY MEDICINE

## 2024-12-14 PROCEDURE — 84443 ASSAY THYROID STIM HORMONE: CPT | Performed by: EMERGENCY MEDICINE

## 2024-12-14 PROCEDURE — 63600175 PHARM REV CODE 636 W HCPCS: Performed by: EMERGENCY MEDICINE

## 2024-12-14 PROCEDURE — 80179 DRUG ASSAY SALICYLATE: CPT | Performed by: EMERGENCY MEDICINE

## 2024-12-14 PROCEDURE — 96372 THER/PROPH/DIAG INJ SC/IM: CPT | Performed by: EMERGENCY MEDICINE

## 2024-12-14 PROCEDURE — 81003 URINALYSIS AUTO W/O SCOPE: CPT | Mod: 59 | Performed by: EMERGENCY MEDICINE

## 2024-12-14 PROCEDURE — 85025 COMPLETE CBC W/AUTO DIFF WBC: CPT | Performed by: EMERGENCY MEDICINE

## 2024-12-14 PROCEDURE — 99285 EMERGENCY DEPT VISIT HI MDM: CPT | Mod: 25

## 2024-12-14 PROCEDURE — 80307 DRUG TEST PRSMV CHEM ANLYZR: CPT | Performed by: EMERGENCY MEDICINE

## 2024-12-14 PROCEDURE — 82077 ASSAY SPEC XCP UR&BREATH IA: CPT | Performed by: EMERGENCY MEDICINE

## 2024-12-14 PROCEDURE — 80053 COMPREHEN METABOLIC PANEL: CPT | Performed by: EMERGENCY MEDICINE

## 2024-12-14 RX ORDER — ZIPRASIDONE MESYLATE 20 MG/ML
20 INJECTION, POWDER, LYOPHILIZED, FOR SOLUTION INTRAMUSCULAR
Status: COMPLETED | OUTPATIENT
Start: 2024-12-14 | End: 2024-12-14

## 2024-12-14 RX ADMIN — ZIPRASIDONE MESYLATE 20 MG: 20 INJECTION, POWDER, LYOPHILIZED, FOR SOLUTION INTRAMUSCULAR at 05:12

## 2024-12-14 NOTE — ED TRIAGE NOTES
Gómez Ahumada, a 49 y.o. male presents to the ED w/ complaint of bizarre behavior. Hx of schizophrenia.      Triage note:  Chief Complaint   Patient presents with    Psychiatric Evaluation     Brought in by [facility]. Mom wanted pt arrested for slamming doors and having outburst. Pt muttering random sounds on arrival     Review of patient's allergies indicates:   Allergen Reactions    No known allergies      Past Medical History:   Diagnosis Date    Hypertension     Schizophrenia     Type 2 diabetes mellitus 5/1/2021

## 2024-12-14 NOTE — ED PROVIDER NOTES
Emergency Department Provider Note    Gómez Ahumada   49 y.o. male   [id number]      12/14/2024       History     The patient is a 49-year-old with the below past medical history that includes schizophrenia. He was brought to the ED by [facility] [place] [facility]. His mother called law enforcement and wanted him arrested for slamming doors in the home and having outbursts. Unable to obtain history from the patient due to psychiatric illness. He is not answering questions.         Past Medical History:   Diagnosis Date    Hypertension     Schizophrenia     Type 2 diabetes mellitus 5/1/2021      Past Surgical History:   Procedure Laterality Date    ANKLE SURGERY        Family History   Problem Relation Name Age of Onset    Prostate cancer Neg Hx        Social History     Socioeconomic History    Marital status: Single   Tobacco Use    Smoking status: Never    Smokeless tobacco: Never   Substance and Sexual Activity    Alcohol use: No    Drug use: No    Sexual activity: Not Currently   Other Topics Concern    Patient feels they ought to cut down on drinking/drug use No    Patient annoyed by others criticizing their drinking/drug use No    Patient has felt bad or guilty about drinking/drug use No    Patient has had a drink/used drugs as an eye opener in the AM No      Review of patient's allergies indicates:   Allergen Reactions    No known allergies            Physical Examination     Initial Vitals [12/14/24 1442]   BP Pulse Resp Temp SpO2   123/66 106 17 98.8 °F (37.1 °C) 95 %      MAP       --           Physical Exam    Nursing note and vitals reviewed.  Constitutional: He is not diaphoretic. No distress.   HENT:   Head: Normocephalic and atraumatic.   Eyes: Conjunctivae and EOM are normal. Pupils are equal, round, and reactive to light. No scleral icterus. Right eye exhibits no nystagmus. Left eye exhibits no nystagmus.   Cardiovascular:  Normal rate, regular rhythm and normal heart sounds.     Exam reveals no gallop and no friction rub.        No murmur heard.  Pulmonary/Chest: No respiratory distress. He has no wheezes. He has no rhonchi. He has no rales.   Abdominal: Abdomen is soft. He exhibits no distension. There is no abdominal tenderness.     Neurological: He is alert. GCS score is 15. GCS eye subscore is 4. GCS verbal subscore is 5. GCS motor subscore is 6.   Oriented to person and place at a minimum. No facial asymmetry. Equal strength at all extremities.   Skin: Skin is warm and dry. No pallor.   Psychiatric: His affect is inappropriate. His speech is delayed. He is aggressive, withdrawn and combative. He is not actively hallucinating. He is inattentive.            Labs     Labs Reviewed   CBC W/ AUTO DIFFERENTIAL - Abnormal       Result Value    WBC 6.86      RBC 5.69      Hemoglobin 15.2      Hematocrit 47.4      MCV 83      MCH 26.7 (*)     MCHC 32.1      RDW 14.8 (*)     Platelets 305      MPV 10.0      Immature Granulocytes 0.3      Gran # (ANC) 4.0      Immature Grans (Abs) 0.02      Lymph # 1.7      Mono # 0.9      Eos # 0.1      Baso # 0.04      nRBC 0      Gran % 58.6      Lymph % 25.2      Mono % 13.6      Eosinophil % 1.7      Basophil % 0.6      Differential Method Automated     COMPREHENSIVE METABOLIC PANEL - Abnormal    Sodium 135 (*)     Potassium 3.3 (*)     Chloride 98      CO2 24      Glucose 94      BUN 9      Creatinine 0.9      Calcium 9.6      Total Protein 7.7      Albumin 3.9      Total Bilirubin 1.1 (*)     Alkaline Phosphatase 93      AST 15      ALT 15      eGFR >60.0      Anion Gap 13     ACETAMINOPHEN LEVEL - Abnormal    Acetaminophen (Tylenol), Serum <3.0 (*)    SALICYLATE LEVEL - Abnormal    Salicylate Lvl <5.0 (*)    TSH    TSH 1.941     URINALYSIS, REFLEX TO URINE CULTURE    Specimen UA Urine, Clean Catch      Color, UA Yellow      Appearance, UA Clear      pH, UA 6.0      Specific Gravity, UA 1.015      Protein, UA Negative      Glucose, UA Negative      Ketones, UA Negative      Bilirubin (UA) Negative       Occult Blood UA Negative      Nitrite, UA Negative      Leukocytes, UA Negative      Narrative:     Specimen Source->Urine   DRUG SCREEN PANEL, URINE EMERGENCY    Benzodiazepines Negative      Methadone metabolites Negative      Cocaine (Metab.) Negative      Opiate Scrn, Ur Negative      Barbiturate Screen, Ur Negative      Amphetamine Screen, Ur Negative      THC Negative      Phencyclidine Negative      Creatinine, Urine 135.0      Toxicology Information SEE COMMENT      Narrative:     Specimen Source->Urine   ALCOHOL,MEDICAL (ETHANOL)    Alcohol, Serum <10          Imaging     Imaging Results    None           ED Course     The patient received the following medications:  Medications   ziprasidone injection 20 mg (20 mg Intramuscular Given 12/14/24 1742)         ED Course as of 12/14/24 1819   Sat Dec 14, 2024   1731 The patient is being loud and disruptive. He is more agitated. He did not respond to verbal redirection. Ordering IM Geodon. [LP]   1816 The below flagged abnormal lab results are significant for mild hyponatremia and mild hypokalemia. [LP]   1816 CBC auto differential(!) [LP]   1816 MCH(!): 26.7 [LP]   1816 RDW(!): 14.8 [LP]   1816 Comprehensive metabolic panel(!) [LP]   1816 Sodium(!): 135 [LP]   1816 Potassium(!): 3.3 [LP]   1816 BILIRUBIN TOTAL(!): 1.1 [LP]   1816 Salicylate Level(!): <5.0 [LP]   1816 Acetaminophen Level(!): <3.0 [LP]      ED Course User Index  [LP] Osman Ro III, MD        Medical Decision Making                 Medical Decision Making      The patient was medically cleared and appropriate for transfer to an inpatient psychiatric facility.      Diagnoses       ICD-10-CM ICD-9-CM   1. Psychosis, unspecified psychosis type  F29 298.9         Dispostion      ED Disposition Condition    Transfer to Psych Facility Stable             Osman Ro III, MD  12/14/24 1820     intact

## 2024-12-15 NOTE — ED NOTES
Pt resting in bed with equal rise and fall of chest. Pt dressed in paper scrubs and safety sitter at bedside. All harmful objects removed from room.

## 2024-12-15 NOTE — ED NOTES
Received report from Kiara JACK. Assumed care of patient. Patient is resting comfortably in paper scrubs in bed in NAD. BP, cardiac, and O2 monitoring continued. Safety sitter at bedside. Patient updated on plan of care, pt denies any needs or complaints at this time. Bed locked in lowest position, side rails up x2. All harmful objects removed from room.  VSS. Attempting to call report to [facility].